# Patient Record
Sex: MALE | Race: WHITE
[De-identification: names, ages, dates, MRNs, and addresses within clinical notes are randomized per-mention and may not be internally consistent; named-entity substitution may affect disease eponyms.]

---

## 2018-07-29 ENCOUNTER — HOSPITAL ENCOUNTER (EMERGENCY)
Dept: HOSPITAL 12 - ER | Age: 58
LOS: 1 days | Discharge: HOME | End: 2018-07-30
Payer: SELF-PAY

## 2018-07-29 VITALS — WEIGHT: 140 LBS | BODY MASS INDEX: 20.73 KG/M2 | HEIGHT: 69 IN

## 2018-07-29 DIAGNOSIS — Z90.3: ICD-10-CM

## 2018-07-29 DIAGNOSIS — H57.11: Primary | ICD-10-CM

## 2018-07-29 PROCEDURE — A4663 DIALYSIS BLOOD PRESSURE CUFF: HCPCS

## 2018-07-30 VITALS — DIASTOLIC BLOOD PRESSURE: 78 MMHG | SYSTOLIC BLOOD PRESSURE: 118 MMHG

## 2018-07-30 NOTE — NUR
Patient discharged to home in stable conditon. Patient reported eye being 
pain-free and vision returning to baseline prior to discharge. Written and 
verbal after care instructions given. Patient verbalizes understanding of 
instructions. Patient able to ambulate unassisted with a steady gait. Patient 
left with all personal belongings.

## 2018-12-14 ENCOUNTER — HOSPITAL ENCOUNTER (INPATIENT)
Dept: HOSPITAL 12 - SRC | Age: 58
LOS: 8 days | Discharge: OTHER INSTITUTION | DRG: 895 | End: 2018-12-22
Attending: INTERNAL MEDICINE | Admitting: INTERNAL MEDICINE
Payer: COMMERCIAL

## 2018-12-14 VITALS — SYSTOLIC BLOOD PRESSURE: 148 MMHG | DIASTOLIC BLOOD PRESSURE: 86 MMHG

## 2018-12-14 VITALS — DIASTOLIC BLOOD PRESSURE: 73 MMHG | SYSTOLIC BLOOD PRESSURE: 127 MMHG

## 2018-12-14 VITALS — SYSTOLIC BLOOD PRESSURE: 126 MMHG | DIASTOLIC BLOOD PRESSURE: 67 MMHG

## 2018-12-14 VITALS — WEIGHT: 130 LBS | BODY MASS INDEX: 19.26 KG/M2 | HEIGHT: 69 IN

## 2018-12-14 VITALS — DIASTOLIC BLOOD PRESSURE: 61 MMHG | SYSTOLIC BLOOD PRESSURE: 106 MMHG

## 2018-12-14 VITALS — DIASTOLIC BLOOD PRESSURE: 90 MMHG | SYSTOLIC BLOOD PRESSURE: 184 MMHG

## 2018-12-14 DIAGNOSIS — F15.20: ICD-10-CM

## 2018-12-14 DIAGNOSIS — E11.9: ICD-10-CM

## 2018-12-14 DIAGNOSIS — I10: ICD-10-CM

## 2018-12-14 DIAGNOSIS — J96.21: ICD-10-CM

## 2018-12-14 DIAGNOSIS — Z79.4: ICD-10-CM

## 2018-12-14 DIAGNOSIS — Z90.3: ICD-10-CM

## 2018-12-14 DIAGNOSIS — F41.9: ICD-10-CM

## 2018-12-14 DIAGNOSIS — J69.0: ICD-10-CM

## 2018-12-14 DIAGNOSIS — D64.9: ICD-10-CM

## 2018-12-14 DIAGNOSIS — J15.9: ICD-10-CM

## 2018-12-14 DIAGNOSIS — K86.1: ICD-10-CM

## 2018-12-14 DIAGNOSIS — E83.42: ICD-10-CM

## 2018-12-14 DIAGNOSIS — E46: ICD-10-CM

## 2018-12-14 DIAGNOSIS — J44.0: ICD-10-CM

## 2018-12-14 DIAGNOSIS — F17.210: ICD-10-CM

## 2018-12-14 DIAGNOSIS — Z85.028: ICD-10-CM

## 2018-12-14 DIAGNOSIS — E11.649: ICD-10-CM

## 2018-12-14 DIAGNOSIS — J45.909: ICD-10-CM

## 2018-12-14 DIAGNOSIS — Z87.01: ICD-10-CM

## 2018-12-14 DIAGNOSIS — F11.23: ICD-10-CM

## 2018-12-14 DIAGNOSIS — J44.1: ICD-10-CM

## 2018-12-14 DIAGNOSIS — R79.89: ICD-10-CM

## 2018-12-14 DIAGNOSIS — F13.230: Primary | ICD-10-CM

## 2018-12-14 DIAGNOSIS — F32.9: ICD-10-CM

## 2018-12-14 LAB
ALP SERPL-CCNC: 78 U/L (ref 50–136)
ALT SERPL W/O P-5'-P-CCNC: 31 U/L (ref 16–63)
AMPHETAMINES UR QL SCN>1000 NG/ML: POSITIVE
AST SERPL-CCNC: 17 U/L (ref 15–37)
BARBITURATES UR QL SCN: NEGATIVE
BASOPHILS # BLD AUTO: 0 K/UL (ref 0–8)
BASOPHILS NFR BLD AUTO: 0.3 % (ref 0–2)
BILIRUB SERPL-MCNC: 0.2 MG/DL (ref 0.2–1)
BUN SERPL-MCNC: 24 MG/DL (ref 7–18)
CHLORIDE SERPL-SCNC: 105 MMOL/L (ref 98–107)
CO2 SERPL-SCNC: 24 MMOL/L (ref 21–32)
COCAINE UR QL SCN: NEGATIVE
CREAT SERPL-MCNC: 1.1 MG/DL (ref 0.6–1.3)
EOSINOPHIL # BLD AUTO: 0.1 K/UL (ref 0–0.7)
EOSINOPHIL NFR BLD AUTO: 1.1 % (ref 0–7)
ETHANOL SERPL-MCNC: < 3 MG/DL (ref 0–0)
GLUCOSE SERPL-MCNC: 294 MG/DL (ref 74–106)
HCT VFR BLD AUTO: 33.9 % (ref 36.7–47.1)
HGB BLD-MCNC: 11.4 G/DL (ref 12.5–16.3)
LYMPHOCYTES # BLD AUTO: 2.2 K/UL (ref 20–40)
LYMPHOCYTES NFR BLD AUTO: 22.3 % (ref 20.5–51.5)
MAGNESIUM SERPL-MCNC: 1.8 MG/DL (ref 1.8–2.4)
MCH RBC QN AUTO: 31.7 UUG (ref 23.8–33.4)
MCHC RBC AUTO-ENTMCNC: 34 G/DL (ref 32.5–36.3)
MCV RBC AUTO: 94 FL (ref 73–96.2)
MONOCYTES # BLD AUTO: 0.7 K/UL (ref 2–10)
MONOCYTES NFR BLD AUTO: 7.2 % (ref 0–11)
NEUTROPHILS # BLD AUTO: 6.7 K/UL (ref 1.8–8.9)
NEUTROPHILS NFR BLD AUTO: 69.1 % (ref 38.5–71.5)
OPIATES UR QL SCN: POSITIVE
PCP UR QL SCN>25 NG/ML: NEGATIVE
PLATELET # BLD AUTO: 379 K/UL (ref 152–348)
POTASSIUM SERPL-SCNC: 5.1 MMOL/L (ref 3.5–5.1)
RBC # BLD AUTO: 3.6 MIL/UL (ref 4.06–5.63)
THC UR QL SCN>50 NG/ML: NEGATIVE
TSH SERPL DL<=0.005 MIU/L-ACNC: 2.37 MIU/ML (ref 0.36–3.74)
WBC # BLD AUTO: 9.7 K/UL (ref 3.6–10.2)
WS STN SPEC: 6.5 G/DL (ref 6.4–8.2)

## 2018-12-14 PROCEDURE — A4663 DIALYSIS BLOOD PRESSURE CUFF: HCPCS

## 2018-12-14 PROCEDURE — G0480 DRUG TEST DEF 1-7 CLASSES: HCPCS

## 2018-12-14 PROCEDURE — HZ2ZZZZ DETOXIFICATION SERVICES FOR SUBSTANCE ABUSE TREATMENT: ICD-10-PCS | Performed by: INTERNAL MEDICINE

## 2018-12-14 RX ADMIN — DIAZEPAM SCH MG: 10 TABLET ORAL at 21:20

## 2018-12-14 RX ADMIN — Medication SCH MG: at 21:20

## 2018-12-14 RX ADMIN — Medication SCH CAP: at 16:48

## 2018-12-14 RX ADMIN — FLUTICASONE FUROATE AND VILANTEROL TRIFENATATE SCH EACH: 100; 25 POWDER RESPIRATORY (INHALATION) at 15:17

## 2018-12-14 RX ADMIN — VALACYCLOVIR HYDROCHLORIDE SCH MG: 500 TABLET, FILM COATED ORAL at 15:16

## 2018-12-14 RX ADMIN — SODIUM CHLORIDE PRN UNIT: 9 INJECTION, SOLUTION INTRAVENOUS at 16:46

## 2018-12-14 RX ADMIN — Medication SCH EACH: at 16:42

## 2018-12-14 RX ADMIN — SODIUM CHLORIDE PRN UNIT: 9 INJECTION, SOLUTION INTRAVENOUS at 21:34

## 2018-12-14 RX ADMIN — AMITRIPTYLINE HYDROCHLORIDE SCH MG: 50 TABLET, FILM COATED ORAL at 21:21

## 2018-12-14 RX ADMIN — SODIUM CHLORIDE PRN UNIT: 9 INJECTION, SOLUTION INTRAVENOUS at 11:44

## 2018-12-14 RX ADMIN — SODIUM CHLORIDE PRN UNIT: 9 INJECTION, SOLUTION INTRAVENOUS at 08:53

## 2018-12-14 RX ADMIN — Medication SCH EACH: at 21:31

## 2018-12-14 RX ADMIN — Medication SCH EACH: at 11:39

## 2018-12-14 RX ADMIN — LORAZEPAM PRN MG: 1 TABLET ORAL at 08:56

## 2018-12-14 RX ADMIN — Medication SCH CAP: at 15:16

## 2018-12-14 RX ADMIN — DIAZEPAM SCH MG: 10 TABLET ORAL at 15:16

## 2018-12-14 RX ADMIN — SODIUM CHLORIDE PRN UNIT: 9 INJECTION, SOLUTION INTRAVENOUS at 09:44

## 2018-12-14 RX ADMIN — Medication SCH EACH: at 08:52

## 2018-12-14 RX ADMIN — BUPRENORPHINE HYDROCHLORIDE SCH MG: 2 TABLET SUBLINGUAL at 21:20

## 2018-12-14 RX ADMIN — CLONIDINE HYDROCHLORIDE PRN MG: 0.1 TABLET ORAL at 09:08

## 2018-12-14 RX ADMIN — PREGABALIN SCH MG: 100 CAPSULE ORAL at 16:48

## 2018-12-14 RX ADMIN — BUPRENORPHINE HYDROCHLORIDE SCH MG: 2 TABLET SUBLINGUAL at 13:25

## 2018-12-14 RX ADMIN — BUPRENORPHINE HYDROCHLORIDE SCH MG: 2 TABLET SUBLINGUAL at 17:00

## 2018-12-14 RX ADMIN — LORAZEPAM PRN MG: 1 TABLET ORAL at 13:25

## 2018-12-14 RX ADMIN — ACETAMINOPHEN PRN MG: 325 TABLET ORAL at 09:45

## 2018-12-14 RX ADMIN — INSULIN GLARGINE SCH UNITS: 100 INJECTION, SOLUTION SUBCUTANEOUS at 21:33

## 2018-12-14 RX ADMIN — OLANZAPINE SCH MG: 2.5 TABLET ORAL at 21:22

## 2018-12-14 RX ADMIN — Medication SCH MG: at 15:17

## 2018-12-15 VITALS — DIASTOLIC BLOOD PRESSURE: 56 MMHG | SYSTOLIC BLOOD PRESSURE: 98 MMHG

## 2018-12-15 VITALS — DIASTOLIC BLOOD PRESSURE: 74 MMHG | SYSTOLIC BLOOD PRESSURE: 137 MMHG

## 2018-12-15 VITALS — DIASTOLIC BLOOD PRESSURE: 64 MMHG | SYSTOLIC BLOOD PRESSURE: 126 MMHG

## 2018-12-15 VITALS — SYSTOLIC BLOOD PRESSURE: 93 MMHG | DIASTOLIC BLOOD PRESSURE: 50 MMHG

## 2018-12-15 VITALS — DIASTOLIC BLOOD PRESSURE: 69 MMHG | SYSTOLIC BLOOD PRESSURE: 111 MMHG

## 2018-12-15 RX ADMIN — Medication SCH MG: at 21:32

## 2018-12-15 RX ADMIN — Medication SCH MG: at 16:34

## 2018-12-15 RX ADMIN — THERA TABS SCH UDTAB: TAB at 08:27

## 2018-12-15 RX ADMIN — Medication SCH MG: at 10:51

## 2018-12-15 RX ADMIN — KETOROLAC TROMETHAMINE PRN MG: 30 INJECTION, SOLUTION INTRAMUSCULAR; INTRAVENOUS at 08:38

## 2018-12-15 RX ADMIN — BUPRENORPHINE HYDROCHLORIDE SCH MG: 2 TABLET SUBLINGUAL at 21:32

## 2018-12-15 RX ADMIN — PREGABALIN SCH MG: 100 CAPSULE ORAL at 08:23

## 2018-12-15 RX ADMIN — AMITRIPTYLINE HYDROCHLORIDE SCH MG: 50 TABLET, FILM COATED ORAL at 21:32

## 2018-12-15 RX ADMIN — ACETAMINOPHEN PRN MG: 325 TABLET ORAL at 04:26

## 2018-12-15 RX ADMIN — HYDROXYZINE PAMOATE PRN MG: 25 CAPSULE ORAL at 17:27

## 2018-12-15 RX ADMIN — INSULIN GLARGINE SCH UNITS: 100 INJECTION, SOLUTION SUBCUTANEOUS at 21:10

## 2018-12-15 RX ADMIN — METHOCARBAMOL TABLETS PRN MG: 750 TABLET, COATED ORAL at 16:41

## 2018-12-15 RX ADMIN — DIAZEPAM SCH MG: 5 TABLET ORAL at 16:34

## 2018-12-15 RX ADMIN — SODIUM CHLORIDE PRN UNIT: 9 INJECTION, SOLUTION INTRAVENOUS at 12:24

## 2018-12-15 RX ADMIN — FLUTICASONE FUROATE AND VILANTEROL TRIFENATATE SCH EACH: 100; 25 POWDER RESPIRATORY (INHALATION) at 08:28

## 2018-12-15 RX ADMIN — Medication SCH CAP: at 16:41

## 2018-12-15 RX ADMIN — CLONIDINE HYDROCHLORIDE PRN MG: 0.1 TABLET ORAL at 04:26

## 2018-12-15 RX ADMIN — DIAZEPAM SCH MG: 5 TABLET ORAL at 21:32

## 2018-12-15 RX ADMIN — ACETAMINOPHEN PRN MG: 325 TABLET ORAL at 16:41

## 2018-12-15 RX ADMIN — PREGABALIN SCH MG: 100 CAPSULE ORAL at 16:34

## 2018-12-15 RX ADMIN — OLANZAPINE SCH MG: 2.5 TABLET ORAL at 21:31

## 2018-12-15 RX ADMIN — Medication SCH EACH: at 12:12

## 2018-12-15 RX ADMIN — Medication SCH EACH: at 08:00

## 2018-12-15 RX ADMIN — BUPRENORPHINE HYDROCHLORIDE SCH MG: 2 TABLET SUBLINGUAL at 08:23

## 2018-12-15 RX ADMIN — Medication SCH EACH: at 16:29

## 2018-12-15 RX ADMIN — DIAZEPAM SCH MG: 5 TABLET ORAL at 08:27

## 2018-12-15 RX ADMIN — LIDOCAINE SCH PATCH: 50 PATCH CUTANEOUS at 18:56

## 2018-12-15 RX ADMIN — Medication SCH CAP: at 08:29

## 2018-12-15 RX ADMIN — Medication SCH CAP: at 13:59

## 2018-12-15 RX ADMIN — Medication SCH MG: at 08:27

## 2018-12-15 RX ADMIN — BUPRENORPHINE HYDROCHLORIDE SCH MG: 2 TABLET SUBLINGUAL at 13:59

## 2018-12-15 RX ADMIN — VALACYCLOVIR HYDROCHLORIDE SCH MG: 500 TABLET, FILM COATED ORAL at 08:22

## 2018-12-15 RX ADMIN — Medication SCH EACH: at 21:07

## 2018-12-15 RX ADMIN — DIAZEPAM SCH MG: 5 TABLET ORAL at 13:58

## 2018-12-15 RX ADMIN — KETOROLAC TROMETHAMINE PRN MG: 30 INJECTION, SOLUTION INTRAMUSCULAR; INTRAVENOUS at 17:28

## 2018-12-15 RX ADMIN — METHOCARBAMOL TABLETS PRN MG: 750 TABLET, COATED ORAL at 04:25

## 2018-12-16 VITALS — DIASTOLIC BLOOD PRESSURE: 57 MMHG | SYSTOLIC BLOOD PRESSURE: 114 MMHG

## 2018-12-16 VITALS — DIASTOLIC BLOOD PRESSURE: 67 MMHG | SYSTOLIC BLOOD PRESSURE: 125 MMHG

## 2018-12-16 VITALS — SYSTOLIC BLOOD PRESSURE: 108 MMHG | DIASTOLIC BLOOD PRESSURE: 61 MMHG

## 2018-12-16 VITALS — DIASTOLIC BLOOD PRESSURE: 57 MMHG | SYSTOLIC BLOOD PRESSURE: 109 MMHG

## 2018-12-16 LAB
ALP SERPL-CCNC: 71 U/L (ref 50–136)
ALT SERPL W/O P-5'-P-CCNC: 31 U/L (ref 16–63)
APPEARANCE UR: CLEAR
AST SERPL-CCNC: 47 U/L (ref 15–37)
BASOPHILS # BLD AUTO: 0.1 K/UL (ref 0–8)
BASOPHILS NFR BLD AUTO: 0.5 % (ref 0–2)
BILIRUB SERPL-MCNC: 0.3 MG/DL (ref 0.2–1)
BILIRUB UR QL STRIP: NEGATIVE
BUN SERPL-MCNC: 37 MG/DL (ref 7–18)
CHLORIDE SERPL-SCNC: 106 MMOL/L (ref 98–107)
CO2 SERPL-SCNC: 25 MMOL/L (ref 21–32)
COLOR UR: YELLOW
CREAT SERPL-MCNC: 1.2 MG/DL (ref 0.6–1.3)
EOSINOPHIL # BLD AUTO: 0 K/UL (ref 0–0.7)
EOSINOPHIL NFR BLD AUTO: 0.1 % (ref 0–7)
GLUCOSE SERPL-MCNC: 86 MG/DL (ref 74–106)
GLUCOSE UR STRIP-MCNC: NEGATIVE MG/DL
HCT VFR BLD AUTO: 32.3 % (ref 36.7–47.1)
HGB BLD-MCNC: 10.9 G/DL (ref 12.5–16.3)
HGB UR QL STRIP: NEGATIVE
KETONES UR STRIP-MCNC: NEGATIVE MG/DL
LEUKOCYTE ESTERASE UR QL STRIP: NEGATIVE
LYMPHOCYTES # BLD AUTO: 1.6 K/UL (ref 20–40)
LYMPHOCYTES NFR BLD AUTO: 10.3 % (ref 20.5–51.5)
MAGNESIUM SERPL-MCNC: 2.2 MG/DL (ref 1.8–2.4)
MCH RBC QN AUTO: 31.9 UUG (ref 23.8–33.4)
MCHC RBC AUTO-ENTMCNC: 34 G/DL (ref 32.5–36.3)
MCV RBC AUTO: 94.1 FL (ref 73–96.2)
MONOCYTES # BLD AUTO: 1 K/UL (ref 2–10)
MONOCYTES NFR BLD AUTO: 6.6 % (ref 0–11)
MUCOUS THREADS URNS QL MICRO: (no result) /LPF
NEUTROPHILS # BLD AUTO: 12.9 K/UL (ref 1.8–8.9)
NEUTROPHILS NFR BLD AUTO: 82.5 % (ref 38.5–71.5)
NITRITE UR QL STRIP: NEGATIVE
PH UR STRIP: 5.5 [PH] (ref 5–8)
PLATELET # BLD AUTO: 368 K/UL (ref 152–348)
POTASSIUM SERPL-SCNC: 4.5 MMOL/L (ref 3.5–5.1)
PROT UR QL STRIP: NEGATIVE
RBC # BLD AUTO: 3.43 MIL/UL (ref 4.06–5.63)
SP GR UR STRIP: 1.02 (ref 1–1.03)
UROBILINOGEN UR STRIP-MCNC: 0.2 E.U./DL
WBC # BLD AUTO: 15.6 K/UL (ref 3.6–10.2)
WBC #/AREA URNS HPF: (no result) /HPF (ref 0–3)
WS STN SPEC: 5.8 G/DL (ref 6.4–8.2)

## 2018-12-16 RX ADMIN — THERA TABS SCH UDTAB: TAB at 09:00

## 2018-12-16 RX ADMIN — VALACYCLOVIR HYDROCHLORIDE SCH MG: 500 TABLET, FILM COATED ORAL at 08:54

## 2018-12-16 RX ADMIN — ACETAMINOPHEN PRN MG: 325 TABLET ORAL at 16:37

## 2018-12-16 RX ADMIN — ALBUTEROL SULFATE SCH MG: 2.5 SOLUTION RESPIRATORY (INHALATION) at 19:48

## 2018-12-16 RX ADMIN — Medication SCH MG: at 09:00

## 2018-12-16 RX ADMIN — DIAZEPAM SCH MG: 5 TABLET ORAL at 09:00

## 2018-12-16 RX ADMIN — AMITRIPTYLINE HYDROCHLORIDE SCH MG: 50 TABLET, FILM COATED ORAL at 21:10

## 2018-12-16 RX ADMIN — Medication SCH MG: at 08:53

## 2018-12-16 RX ADMIN — PREGABALIN SCH MG: 100 CAPSULE ORAL at 17:08

## 2018-12-16 RX ADMIN — Medication SCH EACH: at 21:09

## 2018-12-16 RX ADMIN — Medication SCH EACH: at 08:01

## 2018-12-16 RX ADMIN — BUPRENORPHINE HYDROCHLORIDE SCH MG: 2 TABLET SUBLINGUAL at 21:00

## 2018-12-16 RX ADMIN — Medication SCH CAP: at 09:00

## 2018-12-16 RX ADMIN — DIAZEPAM SCH MG: 5 TABLET ORAL at 14:26

## 2018-12-16 RX ADMIN — OLANZAPINE SCH MG: 2.5 TABLET ORAL at 21:10

## 2018-12-16 RX ADMIN — FLUTICASONE FUROATE AND VILANTEROL TRIFENATATE SCH EACH: 100; 25 POWDER RESPIRATORY (INHALATION) at 09:00

## 2018-12-16 RX ADMIN — PREGABALIN SCH MG: 100 CAPSULE ORAL at 08:54

## 2018-12-16 RX ADMIN — Medication SCH ML: at 13:00

## 2018-12-16 RX ADMIN — Medication SCH CAP: at 17:07

## 2018-12-16 RX ADMIN — SODIUM CHLORIDE PRN UNIT: 9 INJECTION, SOLUTION INTRAVENOUS at 17:05

## 2018-12-16 RX ADMIN — LIDOCAINE SCH PATCH: 50 PATCH CUTANEOUS at 19:10

## 2018-12-16 RX ADMIN — IPRATROPIUM BROMIDE SCH MG: 0.5 SOLUTION RESPIRATORY (INHALATION) at 19:48

## 2018-12-16 RX ADMIN — Medication SCH CAP: at 13:00

## 2018-12-16 RX ADMIN — Medication SCH MG: at 21:00

## 2018-12-16 RX ADMIN — Medication SCH MG: at 08:54

## 2018-12-16 RX ADMIN — DEXTROSE SCH MLS/HR: 5 SOLUTION INTRAVENOUS at 15:56

## 2018-12-16 RX ADMIN — Medication SCH ML: at 17:34

## 2018-12-16 RX ADMIN — BUPRENORPHINE HYDROCHLORIDE SCH MG: 2 TABLET SUBLINGUAL at 15:57

## 2018-12-16 RX ADMIN — Medication SCH EACH: at 11:33

## 2018-12-16 RX ADMIN — Medication SCH EACH: at 16:37

## 2018-12-16 RX ADMIN — DIAZEPAM SCH MG: 5 TABLET ORAL at 21:09

## 2018-12-16 RX ADMIN — VALACYCLOVIR HYDROCHLORIDE SCH MG: 500 TABLET, FILM COATED ORAL at 09:00

## 2018-12-17 VITALS — SYSTOLIC BLOOD PRESSURE: 97 MMHG | DIASTOLIC BLOOD PRESSURE: 46 MMHG

## 2018-12-17 VITALS — SYSTOLIC BLOOD PRESSURE: 95 MMHG | DIASTOLIC BLOOD PRESSURE: 55 MMHG

## 2018-12-17 VITALS — DIASTOLIC BLOOD PRESSURE: 54 MMHG | SYSTOLIC BLOOD PRESSURE: 87 MMHG

## 2018-12-17 VITALS — DIASTOLIC BLOOD PRESSURE: 55 MMHG | SYSTOLIC BLOOD PRESSURE: 105 MMHG

## 2018-12-17 LAB
BASOPHILS # BLD AUTO: 0 K/UL (ref 0–8)
BASOPHILS NFR BLD AUTO: 0.3 % (ref 0–2)
BUN SERPL-MCNC: 33 MG/DL (ref 7–18)
CHLORIDE SERPL-SCNC: 102 MMOL/L (ref 98–107)
CO2 SERPL-SCNC: 29 MMOL/L (ref 21–32)
CREAT SERPL-MCNC: 1.2 MG/DL (ref 0.6–1.3)
EOSINOPHIL # BLD AUTO: 0 K/UL (ref 0–0.7)
EOSINOPHIL NFR BLD AUTO: 0.1 % (ref 0–7)
GLUCOSE SERPL-MCNC: 82 MG/DL (ref 74–106)
HCT VFR BLD AUTO: 33.7 % (ref 36.7–47.1)
HGB BLD-MCNC: 11.3 G/DL (ref 12.5–16.3)
LYMPHOCYTES # BLD AUTO: 0.9 K/UL (ref 20–40)
LYMPHOCYTES NFR BLD AUTO: 8.9 % (ref 20.5–51.5)
MAGNESIUM SERPL-MCNC: 2 MG/DL (ref 1.8–2.4)
MCH RBC QN AUTO: 31.9 UUG (ref 23.8–33.4)
MCHC RBC AUTO-ENTMCNC: 34 G/DL (ref 32.5–36.3)
MCV RBC AUTO: 95.4 FL (ref 73–96.2)
MONOCYTES # BLD AUTO: 0.5 K/UL (ref 2–10)
MONOCYTES NFR BLD AUTO: 5 % (ref 0–11)
NEUTROPHILS # BLD AUTO: 8.4 K/UL (ref 1.8–8.9)
NEUTROPHILS NFR BLD AUTO: 85.7 % (ref 38.5–71.5)
PHOSPHATE SERPL-MCNC: 3.3 MG/DL (ref 2.5–4.9)
PLATELET # BLD AUTO: 414 K/UL (ref 152–348)
POTASSIUM SERPL-SCNC: 4.5 MMOL/L (ref 3.5–5.1)
RBC # BLD AUTO: 3.54 MIL/UL (ref 4.06–5.63)
WBC # BLD AUTO: 9.8 K/UL (ref 3.6–10.2)

## 2018-12-17 PROCEDURE — HZ31ZZZ INDIVIDUAL COUNSELING FOR SUBSTANCE ABUSE TREATMENT, BEHAVIORAL: ICD-10-PCS

## 2018-12-17 RX ADMIN — BUPRENORPHINE HYDROCHLORIDE SCH MG: 2 TABLET SUBLINGUAL at 21:30

## 2018-12-17 RX ADMIN — VALACYCLOVIR HYDROCHLORIDE SCH MG: 500 TABLET, FILM COATED ORAL at 12:11

## 2018-12-17 RX ADMIN — Medication SCH ML: at 12:30

## 2018-12-17 RX ADMIN — Medication SCH MG: at 09:00

## 2018-12-17 RX ADMIN — METHOCARBAMOL TABLETS PRN MG: 750 TABLET, COATED ORAL at 21:30

## 2018-12-17 RX ADMIN — LIDOCAINE SCH PATCH: 50 PATCH CUTANEOUS at 21:00

## 2018-12-17 RX ADMIN — DIAZEPAM SCH MG: 5 TABLET ORAL at 21:29

## 2018-12-17 RX ADMIN — FLUTICASONE FUROATE AND VILANTEROL TRIFENATATE SCH EACH: 100; 25 POWDER RESPIRATORY (INHALATION) at 09:36

## 2018-12-17 RX ADMIN — Medication SCH EACH: at 16:51

## 2018-12-17 RX ADMIN — Medication SCH EACH: at 07:39

## 2018-12-17 RX ADMIN — SODIUM CHLORIDE SCH MLS/HR: 0.9 INJECTION, SOLUTION INTRAVENOUS at 09:40

## 2018-12-17 RX ADMIN — TAZOBACTAM SODIUM AND PIPERACILLIN SODIUM SCH MLS/HR: 375; 3 INJECTION, SOLUTION INTRAVENOUS at 13:40

## 2018-12-17 RX ADMIN — ALBUTEROL SULFATE SCH MG: 2.5 SOLUTION RESPIRATORY (INHALATION) at 14:03

## 2018-12-17 RX ADMIN — Medication SCH CAP: at 17:47

## 2018-12-17 RX ADMIN — Medication SCH EACH: at 21:21

## 2018-12-17 RX ADMIN — Medication SCH MG: at 12:08

## 2018-12-17 RX ADMIN — PREGABALIN SCH MG: 100 CAPSULE ORAL at 12:08

## 2018-12-17 RX ADMIN — BUPRENORPHINE HYDROCHLORIDE SCH MG: 2 TABLET SUBLINGUAL at 09:00

## 2018-12-17 RX ADMIN — ALBUTEROL SULFATE SCH MG: 2.5 SOLUTION RESPIRATORY (INHALATION) at 08:45

## 2018-12-17 RX ADMIN — IPRATROPIUM BROMIDE SCH MG: 0.5 SOLUTION RESPIRATORY (INHALATION) at 20:05

## 2018-12-17 RX ADMIN — IPRATROPIUM BROMIDE SCH MG: 0.5 SOLUTION RESPIRATORY (INHALATION) at 14:03

## 2018-12-17 RX ADMIN — TAZOBACTAM SODIUM AND PIPERACILLIN SODIUM SCH MLS/HR: 375; 3 INJECTION, SOLUTION INTRAVENOUS at 22:33

## 2018-12-17 RX ADMIN — IPRATROPIUM BROMIDE SCH MG: 0.5 SOLUTION RESPIRATORY (INHALATION) at 08:45

## 2018-12-17 RX ADMIN — BUPRENORPHINE HYDROCHLORIDE SCH MG: 2 TABLET SUBLINGUAL at 15:00

## 2018-12-17 RX ADMIN — SODIUM CHLORIDE SCH MLS/HR: 0.9 INJECTION, SOLUTION INTRAVENOUS at 22:33

## 2018-12-17 RX ADMIN — SODIUM CHLORIDE PRN UNIT: 9 INJECTION, SOLUTION INTRAVENOUS at 21:13

## 2018-12-17 RX ADMIN — DIAZEPAM SCH MG: 5 TABLET ORAL at 09:36

## 2018-12-17 RX ADMIN — Medication SCH EACH: at 12:16

## 2018-12-17 RX ADMIN — Medication SCH CAP: at 12:08

## 2018-12-17 RX ADMIN — SODIUM CHLORIDE PRN UNIT: 9 INJECTION, SOLUTION INTRAVENOUS at 17:43

## 2018-12-17 RX ADMIN — Medication SCH ML: at 17:46

## 2018-12-17 RX ADMIN — Medication SCH CAP: at 13:00

## 2018-12-17 RX ADMIN — ACETAMINOPHEN PRN MG: 325 TABLET ORAL at 07:37

## 2018-12-17 RX ADMIN — THERA TABS SCH UDTAB: TAB at 12:08

## 2018-12-17 RX ADMIN — Medication SCH ML: at 08:00

## 2018-12-17 RX ADMIN — DEXTROSE SCH MLS/HR: 5 SOLUTION INTRAVENOUS at 16:09

## 2018-12-17 RX ADMIN — AMITRIPTYLINE HYDROCHLORIDE SCH MG: 50 TABLET, FILM COATED ORAL at 20:25

## 2018-12-17 RX ADMIN — ALBUTEROL SULFATE SCH MG: 2.5 SOLUTION RESPIRATORY (INHALATION) at 20:05

## 2018-12-17 RX ADMIN — PREGABALIN SCH MG: 100 CAPSULE ORAL at 17:47

## 2018-12-18 VITALS — DIASTOLIC BLOOD PRESSURE: 59 MMHG | SYSTOLIC BLOOD PRESSURE: 100 MMHG

## 2018-12-18 VITALS — SYSTOLIC BLOOD PRESSURE: 112 MMHG | DIASTOLIC BLOOD PRESSURE: 63 MMHG

## 2018-12-18 VITALS — DIASTOLIC BLOOD PRESSURE: 62 MMHG | SYSTOLIC BLOOD PRESSURE: 120 MMHG

## 2018-12-18 VITALS — SYSTOLIC BLOOD PRESSURE: 108 MMHG | DIASTOLIC BLOOD PRESSURE: 57 MMHG

## 2018-12-18 LAB
BASOPHILS # BLD AUTO: 0 K/UL (ref 0–8)
BASOPHILS NFR BLD AUTO: 0.4 % (ref 0–2)
BUN SERPL-MCNC: 22 MG/DL (ref 7–18)
CHLORIDE SERPL-SCNC: 102 MMOL/L (ref 98–107)
CO2 SERPL-SCNC: 29 MMOL/L (ref 21–32)
CREAT SERPL-MCNC: 1 MG/DL (ref 0.6–1.3)
EOSINOPHIL # BLD AUTO: 0 K/UL (ref 0–0.7)
EOSINOPHIL NFR BLD AUTO: 0.4 % (ref 0–7)
GLUCOSE SERPL-MCNC: 161 MG/DL (ref 74–106)
HCT VFR BLD AUTO: 27.2 % (ref 36.7–47.1)
HGB BLD-MCNC: 9.2 G/DL (ref 12.5–16.3)
LYMPHOCYTES # BLD AUTO: 1.4 K/UL (ref 20–40)
LYMPHOCYTES NFR BLD AUTO: 15.6 % (ref 20.5–51.5)
MAGNESIUM SERPL-MCNC: 2 MG/DL (ref 1.8–2.4)
MCH RBC QN AUTO: 31.5 UUG (ref 23.8–33.4)
MCHC RBC AUTO-ENTMCNC: 34 G/DL (ref 32.5–36.3)
MCV RBC AUTO: 93.6 FL (ref 73–96.2)
MONOCYTES # BLD AUTO: 0.8 K/UL (ref 2–10)
MONOCYTES NFR BLD AUTO: 9 % (ref 0–11)
NEUTROPHILS # BLD AUTO: 6.9 K/UL (ref 1.8–8.9)
NEUTROPHILS NFR BLD AUTO: 74.6 % (ref 38.5–71.5)
PHOSPHATE SERPL-MCNC: 3.1 MG/DL (ref 2.5–4.9)
PLATELET # BLD AUTO: 360 K/UL (ref 152–348)
POTASSIUM SERPL-SCNC: 4.5 MMOL/L (ref 3.5–5.1)
RBC # BLD AUTO: 2.91 MIL/UL (ref 4.06–5.63)
WBC # BLD AUTO: 9.2 K/UL (ref 3.6–10.2)

## 2018-12-18 RX ADMIN — TAZOBACTAM SODIUM AND PIPERACILLIN SODIUM SCH MLS/HR: 375; 3 INJECTION, SOLUTION INTRAVENOUS at 05:32

## 2018-12-18 RX ADMIN — Medication SCH CAP: at 12:43

## 2018-12-18 RX ADMIN — IPRATROPIUM BROMIDE SCH MG: 0.5 SOLUTION RESPIRATORY (INHALATION) at 19:40

## 2018-12-18 RX ADMIN — Medication SCH EACH: at 21:07

## 2018-12-18 RX ADMIN — IPRATROPIUM BROMIDE SCH MG: 0.5 SOLUTION RESPIRATORY (INHALATION) at 07:35

## 2018-12-18 RX ADMIN — Medication SCH ML: at 12:42

## 2018-12-18 RX ADMIN — Medication SCH EACH: at 12:17

## 2018-12-18 RX ADMIN — AMITRIPTYLINE HYDROCHLORIDE SCH MG: 50 TABLET, FILM COATED ORAL at 20:37

## 2018-12-18 RX ADMIN — FLUTICASONE FUROATE AND VILANTEROL TRIFENATATE SCH EACH: 100; 25 POWDER RESPIRATORY (INHALATION) at 10:07

## 2018-12-18 RX ADMIN — ALBUTEROL SULFATE SCH MG: 2.5 SOLUTION RESPIRATORY (INHALATION) at 13:32

## 2018-12-18 RX ADMIN — Medication SCH MG: at 10:07

## 2018-12-18 RX ADMIN — Medication SCH CAP: at 09:00

## 2018-12-18 RX ADMIN — DEXTROSE SCH MLS/HR: 5 SOLUTION INTRAVENOUS at 15:30

## 2018-12-18 RX ADMIN — ALBUTEROL SULFATE SCH MG: 2.5 SOLUTION RESPIRATORY (INHALATION) at 07:35

## 2018-12-18 RX ADMIN — SODIUM CHLORIDE PRN UNIT: 9 INJECTION, SOLUTION INTRAVENOUS at 12:39

## 2018-12-18 RX ADMIN — SODIUM CHLORIDE PRN UNIT: 9 INJECTION, SOLUTION INTRAVENOUS at 10:19

## 2018-12-18 RX ADMIN — Medication SCH CAP: at 16:49

## 2018-12-18 RX ADMIN — THERA TABS SCH UDTAB: TAB at 09:00

## 2018-12-18 RX ADMIN — LIDOCAINE SCH PATCH: 50 PATCH CUTANEOUS at 20:38

## 2018-12-18 RX ADMIN — SODIUM CHLORIDE SCH MLS/HR: 0.9 INJECTION, SOLUTION INTRAVENOUS at 12:45

## 2018-12-18 RX ADMIN — Medication SCH EACH: at 07:38

## 2018-12-18 RX ADMIN — ENOXAPARIN SODIUM SCH MG: 40 INJECTION SUBCUTANEOUS at 12:41

## 2018-12-18 RX ADMIN — ALBUTEROL SULFATE SCH MG: 2.5 SOLUTION RESPIRATORY (INHALATION) at 08:51

## 2018-12-18 RX ADMIN — SODIUM CHLORIDE PRN UNIT: 9 INJECTION, SOLUTION INTRAVENOUS at 21:09

## 2018-12-18 RX ADMIN — ALBUTEROL SULFATE SCH MG: 2.5 SOLUTION RESPIRATORY (INHALATION) at 19:40

## 2018-12-18 RX ADMIN — VALACYCLOVIR HYDROCHLORIDE SCH MG: 500 TABLET, FILM COATED ORAL at 10:07

## 2018-12-18 RX ADMIN — Medication SCH ML: at 17:34

## 2018-12-18 RX ADMIN — OLANZAPINE SCH MG: 5 TABLET ORAL at 21:02

## 2018-12-18 RX ADMIN — TAZOBACTAM SODIUM AND PIPERACILLIN SODIUM SCH MLS/HR: 375; 3 INJECTION, SOLUTION INTRAVENOUS at 14:12

## 2018-12-18 RX ADMIN — Medication SCH EACH: at 16:52

## 2018-12-18 RX ADMIN — TAZOBACTAM SODIUM AND PIPERACILLIN SODIUM SCH MLS/HR: 375; 3 INJECTION, SOLUTION INTRAVENOUS at 21:07

## 2018-12-18 RX ADMIN — Medication SCH ML: at 10:08

## 2018-12-18 RX ADMIN — SODIUM CHLORIDE PRN UNIT: 9 INJECTION, SOLUTION INTRAVENOUS at 16:51

## 2018-12-18 RX ADMIN — IPRATROPIUM BROMIDE SCH MG: 0.5 SOLUTION RESPIRATORY (INHALATION) at 13:32

## 2018-12-18 RX ADMIN — PREGABALIN SCH MG: 100 CAPSULE ORAL at 10:07

## 2018-12-18 RX ADMIN — IPRATROPIUM BROMIDE SCH MG: 0.5 SOLUTION RESPIRATORY (INHALATION) at 08:51

## 2018-12-18 RX ADMIN — PREGABALIN SCH MG: 100 CAPSULE ORAL at 16:49

## 2018-12-19 VITALS — DIASTOLIC BLOOD PRESSURE: 58 MMHG | SYSTOLIC BLOOD PRESSURE: 100 MMHG

## 2018-12-19 VITALS — SYSTOLIC BLOOD PRESSURE: 123 MMHG | DIASTOLIC BLOOD PRESSURE: 64 MMHG

## 2018-12-19 VITALS — SYSTOLIC BLOOD PRESSURE: 110 MMHG | DIASTOLIC BLOOD PRESSURE: 60 MMHG

## 2018-12-19 VITALS — SYSTOLIC BLOOD PRESSURE: 124 MMHG | DIASTOLIC BLOOD PRESSURE: 62 MMHG

## 2018-12-19 VITALS — SYSTOLIC BLOOD PRESSURE: 131 MMHG | DIASTOLIC BLOOD PRESSURE: 69 MMHG

## 2018-12-19 LAB
BASOPHILS # BLD AUTO: 0.1 K/UL (ref 0–8)
BASOPHILS NFR BLD AUTO: 0.6 % (ref 0–2)
BUN SERPL-MCNC: 16 MG/DL (ref 7–18)
CHLORIDE SERPL-SCNC: 107 MMOL/L (ref 98–107)
CO2 SERPL-SCNC: 30 MMOL/L (ref 21–32)
CREAT SERPL-MCNC: 1 MG/DL (ref 0.6–1.3)
EOSINOPHIL # BLD AUTO: 0.1 K/UL (ref 0–0.7)
EOSINOPHIL NFR BLD AUTO: 1.4 % (ref 0–7)
GLUCOSE SERPL-MCNC: 112 MG/DL (ref 74–106)
HCT VFR BLD AUTO: 30 % (ref 36.7–47.1)
HGB BLD-MCNC: 10.2 G/DL (ref 12.5–16.3)
LYMPHOCYTES # BLD AUTO: 1.8 K/UL (ref 20–40)
LYMPHOCYTES NFR BLD AUTO: 18.8 % (ref 20.5–51.5)
MAGNESIUM SERPL-MCNC: 2.1 MG/DL (ref 1.8–2.4)
MCH RBC QN AUTO: 32.4 UUG (ref 23.8–33.4)
MCHC RBC AUTO-ENTMCNC: 34 G/DL (ref 32.5–36.3)
MCV RBC AUTO: 94.8 FL (ref 73–96.2)
MONOCYTES # BLD AUTO: 0.9 K/UL (ref 2–10)
MONOCYTES NFR BLD AUTO: 9.5 % (ref 0–11)
NEUTROPHILS # BLD AUTO: 6.7 K/UL (ref 1.8–8.9)
NEUTROPHILS NFR BLD AUTO: 69.7 % (ref 38.5–71.5)
PHOSPHATE SERPL-MCNC: 3.5 MG/DL (ref 2.5–4.9)
PLATELET # BLD AUTO: 403 K/UL (ref 152–348)
POTASSIUM SERPL-SCNC: 4.8 MMOL/L (ref 3.5–5.1)
RBC # BLD AUTO: 3.17 MIL/UL (ref 4.06–5.63)
WBC # BLD AUTO: 9.5 K/UL (ref 3.6–10.2)

## 2018-12-19 RX ADMIN — Medication SCH ML: at 08:07

## 2018-12-19 RX ADMIN — ENOXAPARIN SODIUM SCH MG: 40 INJECTION SUBCUTANEOUS at 08:16

## 2018-12-19 RX ADMIN — Medication SCH EACH: at 08:07

## 2018-12-19 RX ADMIN — IPRATROPIUM BROMIDE SCH MG: 0.5 SOLUTION RESPIRATORY (INHALATION) at 19:30

## 2018-12-19 RX ADMIN — IPRATROPIUM BROMIDE SCH MG: 0.5 SOLUTION RESPIRATORY (INHALATION) at 15:12

## 2018-12-19 RX ADMIN — SODIUM CHLORIDE PRN UNIT: 9 INJECTION, SOLUTION INTRAVENOUS at 17:06

## 2018-12-19 RX ADMIN — Medication SCH EACH: at 17:00

## 2018-12-19 RX ADMIN — Medication SCH MG: at 12:27

## 2018-12-19 RX ADMIN — ALBUTEROL SULFATE SCH MG: 2.5 SOLUTION RESPIRATORY (INHALATION) at 07:43

## 2018-12-19 RX ADMIN — FLUTICASONE FUROATE AND VILANTEROL TRIFENATATE SCH EACH: 100; 25 POWDER RESPIRATORY (INHALATION) at 08:08

## 2018-12-19 RX ADMIN — ALBUTEROL SULFATE SCH MG: 2.5 SOLUTION RESPIRATORY (INHALATION) at 19:30

## 2018-12-19 RX ADMIN — LIDOCAINE SCH PATCH: 50 PATCH CUTANEOUS at 20:36

## 2018-12-19 RX ADMIN — Medication SCH ML: at 12:27

## 2018-12-19 RX ADMIN — THERA TABS SCH UDTAB: TAB at 08:12

## 2018-12-19 RX ADMIN — AMITRIPTYLINE HYDROCHLORIDE SCH MG: 50 TABLET, FILM COATED ORAL at 20:32

## 2018-12-19 RX ADMIN — Medication SCH CAP: at 12:27

## 2018-12-19 RX ADMIN — Medication SCH EACH: at 20:33

## 2018-12-19 RX ADMIN — Medication SCH EACH: at 12:27

## 2018-12-19 RX ADMIN — Medication SCH ML: at 17:17

## 2018-12-19 RX ADMIN — ALBUTEROL SULFATE SCH MG: 2.5 SOLUTION RESPIRATORY (INHALATION) at 15:12

## 2018-12-19 RX ADMIN — PREGABALIN SCH MG: 100 CAPSULE ORAL at 17:14

## 2018-12-19 RX ADMIN — PREGABALIN SCH MG: 100 CAPSULE ORAL at 08:11

## 2018-12-19 RX ADMIN — SODIUM CHLORIDE PRN UNIT: 9 INJECTION, SOLUTION INTRAVENOUS at 12:30

## 2018-12-19 RX ADMIN — TAZOBACTAM SODIUM AND PIPERACILLIN SODIUM SCH MLS/HR: 375; 3 INJECTION, SOLUTION INTRAVENOUS at 21:43

## 2018-12-19 RX ADMIN — Medication SCH MG: at 12:26

## 2018-12-19 RX ADMIN — TAZOBACTAM SODIUM AND PIPERACILLIN SODIUM SCH MLS/HR: 375; 3 INJECTION, SOLUTION INTRAVENOUS at 05:12

## 2018-12-19 RX ADMIN — Medication SCH CAP: at 08:10

## 2018-12-19 RX ADMIN — DEXTROSE SCH MLS/HR: 5 SOLUTION INTRAVENOUS at 16:57

## 2018-12-19 RX ADMIN — Medication SCH MG: at 20:34

## 2018-12-19 RX ADMIN — IPRATROPIUM BROMIDE SCH MG: 0.5 SOLUTION RESPIRATORY (INHALATION) at 07:43

## 2018-12-19 RX ADMIN — TAZOBACTAM SODIUM AND PIPERACILLIN SODIUM SCH MLS/HR: 375; 3 INJECTION, SOLUTION INTRAVENOUS at 14:55

## 2018-12-19 RX ADMIN — Medication SCH MG: at 08:12

## 2018-12-19 RX ADMIN — OLANZAPINE SCH MG: 5 TABLET ORAL at 20:34

## 2018-12-19 RX ADMIN — VALACYCLOVIR HYDROCHLORIDE SCH MG: 500 TABLET, FILM COATED ORAL at 08:12

## 2018-12-19 RX ADMIN — SODIUM CHLORIDE PRN UNIT: 9 INJECTION, SOLUTION INTRAVENOUS at 21:33

## 2018-12-19 RX ADMIN — Medication SCH CAP: at 17:00

## 2018-12-20 VITALS — DIASTOLIC BLOOD PRESSURE: 62 MMHG | SYSTOLIC BLOOD PRESSURE: 114 MMHG

## 2018-12-20 VITALS — DIASTOLIC BLOOD PRESSURE: 70 MMHG | SYSTOLIC BLOOD PRESSURE: 133 MMHG

## 2018-12-20 VITALS — SYSTOLIC BLOOD PRESSURE: 109 MMHG | DIASTOLIC BLOOD PRESSURE: 61 MMHG

## 2018-12-20 VITALS — DIASTOLIC BLOOD PRESSURE: 72 MMHG | SYSTOLIC BLOOD PRESSURE: 133 MMHG

## 2018-12-20 VITALS — DIASTOLIC BLOOD PRESSURE: 61 MMHG | SYSTOLIC BLOOD PRESSURE: 115 MMHG

## 2018-12-20 VITALS — DIASTOLIC BLOOD PRESSURE: 64 MMHG | SYSTOLIC BLOOD PRESSURE: 108 MMHG

## 2018-12-20 LAB
BASOPHILS # BLD AUTO: 0.1 K/UL (ref 0–8)
BASOPHILS NFR BLD AUTO: 0.9 % (ref 0–2)
BUN SERPL-MCNC: 13 MG/DL (ref 7–18)
CHLORIDE SERPL-SCNC: 107 MMOL/L (ref 98–107)
CO2 SERPL-SCNC: 30 MMOL/L (ref 21–32)
CREAT SERPL-MCNC: 0.9 MG/DL (ref 0.6–1.3)
EOSINOPHIL # BLD AUTO: 0.1 K/UL (ref 0–0.7)
EOSINOPHIL NFR BLD AUTO: 1.7 % (ref 0–7)
GLUCOSE SERPL-MCNC: 111 MG/DL (ref 74–106)
HCT VFR BLD AUTO: 28.5 % (ref 36.7–47.1)
HGB BLD-MCNC: 9.8 G/DL (ref 12.5–16.3)
LYMPHOCYTES # BLD AUTO: 2 K/UL (ref 20–40)
LYMPHOCYTES NFR BLD AUTO: 22.8 % (ref 20.5–51.5)
MAGNESIUM SERPL-MCNC: 1.7 MG/DL (ref 1.8–2.4)
MCH RBC QN AUTO: 31.5 UUG (ref 23.8–33.4)
MCHC RBC AUTO-ENTMCNC: 34 G/DL (ref 32.5–36.3)
MCV RBC AUTO: 92.1 FL (ref 73–96.2)
MONOCYTES # BLD AUTO: 0.9 K/UL (ref 2–10)
MONOCYTES NFR BLD AUTO: 10.4 % (ref 0–11)
NEUTROPHILS # BLD AUTO: 5.7 K/UL (ref 1.8–8.9)
NEUTROPHILS NFR BLD AUTO: 64.2 % (ref 38.5–71.5)
PHOSPHATE SERPL-MCNC: 4.1 MG/DL (ref 2.5–4.9)
PLATELET # BLD AUTO: 407 K/UL (ref 152–348)
POTASSIUM SERPL-SCNC: 4.2 MMOL/L (ref 3.5–5.1)
RBC # BLD AUTO: 3.09 MIL/UL (ref 4.06–5.63)
WBC # BLD AUTO: 8.9 K/UL (ref 3.6–10.2)

## 2018-12-20 PROCEDURE — HZ59ZZZ INDIVIDUAL PSYCHOTHERAPY FOR SUBSTANCE ABUSE TREATMENT, SUPPORTIVE: ICD-10-PCS

## 2018-12-20 RX ADMIN — DEXTROSE SCH MLS/HR: 5 SOLUTION INTRAVENOUS at 14:42

## 2018-12-20 RX ADMIN — ALBUTEROL SULFATE SCH MG: 2.5 SOLUTION RESPIRATORY (INHALATION) at 13:21

## 2018-12-20 RX ADMIN — Medication SCH EACH: at 20:28

## 2018-12-20 RX ADMIN — OLANZAPINE SCH MG: 5 TABLET ORAL at 20:20

## 2018-12-20 RX ADMIN — SODIUM CHLORIDE PRN UNIT: 9 INJECTION, SOLUTION INTRAVENOUS at 20:36

## 2018-12-20 RX ADMIN — Medication SCH MG: at 08:25

## 2018-12-20 RX ADMIN — Medication SCH CAP: at 08:25

## 2018-12-20 RX ADMIN — IPRATROPIUM BROMIDE SCH MG: 0.5 SOLUTION RESPIRATORY (INHALATION) at 07:27

## 2018-12-20 RX ADMIN — SODIUM CHLORIDE PRN UNIT: 9 INJECTION, SOLUTION INTRAVENOUS at 12:59

## 2018-12-20 RX ADMIN — Medication SCH ML: at 11:36

## 2018-12-20 RX ADMIN — TAZOBACTAM SODIUM AND PIPERACILLIN SODIUM SCH MLS/HR: 375; 3 INJECTION, SOLUTION INTRAVENOUS at 22:18

## 2018-12-20 RX ADMIN — Medication SCH EACH: at 07:39

## 2018-12-20 RX ADMIN — PREGABALIN SCH MG: 100 CAPSULE ORAL at 16:35

## 2018-12-20 RX ADMIN — VALACYCLOVIR HYDROCHLORIDE SCH MG: 500 TABLET, FILM COATED ORAL at 08:25

## 2018-12-20 RX ADMIN — ENOXAPARIN SODIUM SCH MG: 40 INJECTION SUBCUTANEOUS at 08:28

## 2018-12-20 RX ADMIN — Medication SCH CAP: at 12:58

## 2018-12-20 RX ADMIN — SODIUM CHLORIDE PRN UNIT: 9 INJECTION, SOLUTION INTRAVENOUS at 17:21

## 2018-12-20 RX ADMIN — ALBUTEROL SULFATE SCH MG: 2.5 SOLUTION RESPIRATORY (INHALATION) at 07:26

## 2018-12-20 RX ADMIN — CLONIDINE HYDROCHLORIDE PRN MG: 0.1 TABLET ORAL at 12:58

## 2018-12-20 RX ADMIN — Medication SCH ML: at 16:35

## 2018-12-20 RX ADMIN — ALBUTEROL SULFATE SCH MG: 2.5 SOLUTION RESPIRATORY (INHALATION) at 19:30

## 2018-12-20 RX ADMIN — AMITRIPTYLINE HYDROCHLORIDE SCH MG: 50 TABLET, FILM COATED ORAL at 20:21

## 2018-12-20 RX ADMIN — TAZOBACTAM SODIUM AND PIPERACILLIN SODIUM SCH MLS/HR: 375; 3 INJECTION, SOLUTION INTRAVENOUS at 13:53

## 2018-12-20 RX ADMIN — KETOROLAC TROMETHAMINE PRN MG: 30 INJECTION, SOLUTION INTRAMUSCULAR; INTRAVENOUS at 07:44

## 2018-12-20 RX ADMIN — THERA TABS SCH UDTAB: TAB at 08:26

## 2018-12-20 RX ADMIN — Medication SCH MG: at 20:20

## 2018-12-20 RX ADMIN — Medication SCH CAP: at 16:35

## 2018-12-20 RX ADMIN — IPRATROPIUM BROMIDE SCH MG: 0.5 SOLUTION RESPIRATORY (INHALATION) at 13:21

## 2018-12-20 RX ADMIN — Medication SCH EACH: at 11:35

## 2018-12-20 RX ADMIN — TAZOBACTAM SODIUM AND PIPERACILLIN SODIUM SCH MLS/HR: 375; 3 INJECTION, SOLUTION INTRAVENOUS at 06:14

## 2018-12-20 RX ADMIN — PREGABALIN SCH MG: 100 CAPSULE ORAL at 08:25

## 2018-12-20 RX ADMIN — Medication SCH ML: at 08:29

## 2018-12-20 RX ADMIN — Medication SCH MG: at 08:26

## 2018-12-20 RX ADMIN — IPRATROPIUM BROMIDE SCH MG: 0.5 SOLUTION RESPIRATORY (INHALATION) at 19:30

## 2018-12-20 RX ADMIN — HYDROXYZINE PAMOATE PRN MG: 25 CAPSULE ORAL at 12:58

## 2018-12-20 RX ADMIN — Medication SCH EACH: at 16:35

## 2018-12-20 RX ADMIN — FLUTICASONE FUROATE AND VILANTEROL TRIFENATATE SCH EACH: 100; 25 POWDER RESPIRATORY (INHALATION) at 09:14

## 2018-12-20 RX ADMIN — LIDOCAINE SCH PATCH: 50 PATCH CUTANEOUS at 20:41

## 2018-12-21 VITALS — SYSTOLIC BLOOD PRESSURE: 121 MMHG | DIASTOLIC BLOOD PRESSURE: 62 MMHG

## 2018-12-21 VITALS — DIASTOLIC BLOOD PRESSURE: 67 MMHG | SYSTOLIC BLOOD PRESSURE: 127 MMHG

## 2018-12-21 VITALS — DIASTOLIC BLOOD PRESSURE: 76 MMHG | SYSTOLIC BLOOD PRESSURE: 147 MMHG

## 2018-12-21 VITALS — DIASTOLIC BLOOD PRESSURE: 64 MMHG | SYSTOLIC BLOOD PRESSURE: 130 MMHG

## 2018-12-21 VITALS — SYSTOLIC BLOOD PRESSURE: 144 MMHG | DIASTOLIC BLOOD PRESSURE: 82 MMHG

## 2018-12-21 LAB
ALP SERPL-CCNC: 86 U/L (ref 50–136)
ALT SERPL W/O P-5'-P-CCNC: 32 U/L (ref 16–63)
AST SERPL-CCNC: 19 U/L (ref 15–37)
BASOPHILS # BLD AUTO: 0.1 K/UL (ref 0–8)
BASOPHILS NFR BLD AUTO: 1 % (ref 0–2)
BILIRUB SERPL-MCNC: 0.2 MG/DL (ref 0.2–1)
BUN SERPL-MCNC: 19 MG/DL (ref 7–18)
CHLORIDE SERPL-SCNC: 107 MMOL/L (ref 98–107)
CO2 SERPL-SCNC: 30 MMOL/L (ref 21–32)
CREAT SERPL-MCNC: 0.9 MG/DL (ref 0.6–1.3)
EOSINOPHIL # BLD AUTO: 0.2 K/UL (ref 0–0.7)
EOSINOPHIL NFR BLD AUTO: 2 % (ref 0–7)
GLUCOSE SERPL-MCNC: 132 MG/DL (ref 74–106)
HCT VFR BLD AUTO: 30.9 % (ref 36.7–47.1)
HGB BLD-MCNC: 10.7 G/DL (ref 12.5–16.3)
LYMPHOCYTES # BLD AUTO: 1.8 K/UL (ref 20–40)
LYMPHOCYTES NFR BLD AUTO: 18.9 % (ref 20.5–51.5)
MAGNESIUM SERPL-MCNC: 1.9 MG/DL (ref 1.8–2.4)
MCH RBC QN AUTO: 31.9 UUG (ref 23.8–33.4)
MCHC RBC AUTO-ENTMCNC: 35 G/DL (ref 32.5–36.3)
MCV RBC AUTO: 92.3 FL (ref 73–96.2)
MONOCYTES # BLD AUTO: 0.9 K/UL (ref 2–10)
MONOCYTES NFR BLD AUTO: 9 % (ref 0–11)
NEUTROPHILS # BLD AUTO: 6.6 K/UL (ref 1.8–8.9)
NEUTROPHILS NFR BLD AUTO: 69.1 % (ref 38.5–71.5)
PLATELET # BLD AUTO: 447 K/UL (ref 152–348)
POTASSIUM SERPL-SCNC: 4.4 MMOL/L (ref 3.5–5.1)
RBC # BLD AUTO: 3.35 MIL/UL (ref 4.06–5.63)
WBC # BLD AUTO: 9.6 K/UL (ref 3.6–10.2)
WS STN SPEC: 6.2 G/DL (ref 6.4–8.2)

## 2018-12-21 RX ADMIN — HYDROXYZINE PAMOATE PRN MG: 25 CAPSULE ORAL at 03:48

## 2018-12-21 RX ADMIN — ENOXAPARIN SODIUM SCH MG: 40 INJECTION SUBCUTANEOUS at 08:53

## 2018-12-21 RX ADMIN — SODIUM CHLORIDE PRN UNIT: 9 INJECTION, SOLUTION INTRAVENOUS at 20:20

## 2018-12-21 RX ADMIN — Medication SCH EACH: at 07:34

## 2018-12-21 RX ADMIN — LIDOCAINE SCH PATCH: 50 PATCH CUTANEOUS at 20:21

## 2018-12-21 RX ADMIN — IPRATROPIUM BROMIDE SCH MG: 0.5 SOLUTION RESPIRATORY (INHALATION) at 13:30

## 2018-12-21 RX ADMIN — TAZOBACTAM SODIUM AND PIPERACILLIN SODIUM SCH MLS/HR: 375; 3 INJECTION, SOLUTION INTRAVENOUS at 21:13

## 2018-12-21 RX ADMIN — Medication SCH CAP: at 16:45

## 2018-12-21 RX ADMIN — Medication SCH ML: at 07:37

## 2018-12-21 RX ADMIN — Medication SCH EACH: at 20:24

## 2018-12-21 RX ADMIN — Medication SCH ML: at 16:48

## 2018-12-21 RX ADMIN — ALBUTEROL SULFATE SCH MG: 2.5 SOLUTION RESPIRATORY (INHALATION) at 07:35

## 2018-12-21 RX ADMIN — Medication SCH CAP: at 12:13

## 2018-12-21 RX ADMIN — TAZOBACTAM SODIUM AND PIPERACILLIN SODIUM SCH MLS/HR: 375; 3 INJECTION, SOLUTION INTRAVENOUS at 06:11

## 2018-12-21 RX ADMIN — THERA TABS SCH UDTAB: TAB at 08:53

## 2018-12-21 RX ADMIN — PREGABALIN SCH MG: 100 CAPSULE ORAL at 16:45

## 2018-12-21 RX ADMIN — IPRATROPIUM BROMIDE SCH MG: 0.5 SOLUTION RESPIRATORY (INHALATION) at 20:16

## 2018-12-21 RX ADMIN — Medication SCH MG: at 08:53

## 2018-12-21 RX ADMIN — Medication SCH MG: at 20:21

## 2018-12-21 RX ADMIN — CLONIDINE HYDROCHLORIDE PRN MG: 0.1 TABLET ORAL at 09:19

## 2018-12-21 RX ADMIN — Medication SCH CAP: at 08:53

## 2018-12-21 RX ADMIN — TAZOBACTAM SODIUM AND PIPERACILLIN SODIUM SCH MLS/HR: 375; 3 INJECTION, SOLUTION INTRAVENOUS at 14:23

## 2018-12-21 RX ADMIN — ALBUTEROL SULFATE SCH MG: 2.5 SOLUTION RESPIRATORY (INHALATION) at 20:16

## 2018-12-21 RX ADMIN — HYDROXYZINE PAMOATE PRN MG: 25 CAPSULE ORAL at 09:18

## 2018-12-21 RX ADMIN — Medication SCH ML: at 12:13

## 2018-12-21 RX ADMIN — ALBUTEROL SULFATE SCH MG: 2.5 SOLUTION RESPIRATORY (INHALATION) at 13:30

## 2018-12-21 RX ADMIN — IPRATROPIUM BROMIDE SCH MG: 0.5 SOLUTION RESPIRATORY (INHALATION) at 07:35

## 2018-12-21 RX ADMIN — Medication SCH EACH: at 16:30

## 2018-12-21 RX ADMIN — OLANZAPINE SCH MG: 5 TABLET ORAL at 20:21

## 2018-12-21 RX ADMIN — KETOROLAC TROMETHAMINE PRN MG: 30 INJECTION, SOLUTION INTRAMUSCULAR; INTRAVENOUS at 07:34

## 2018-12-21 RX ADMIN — AMITRIPTYLINE HYDROCHLORIDE SCH MG: 50 TABLET, FILM COATED ORAL at 20:21

## 2018-12-21 RX ADMIN — VALACYCLOVIR HYDROCHLORIDE SCH MG: 500 TABLET, FILM COATED ORAL at 08:52

## 2018-12-21 RX ADMIN — SODIUM CHLORIDE PRN UNIT: 9 INJECTION, SOLUTION INTRAVENOUS at 08:54

## 2018-12-21 RX ADMIN — FLUTICASONE FUROATE AND VILANTEROL TRIFENATATE SCH EACH: 100; 25 POWDER RESPIRATORY (INHALATION) at 08:52

## 2018-12-21 RX ADMIN — CLONIDINE HYDROCHLORIDE PRN MG: 0.1 TABLET ORAL at 03:47

## 2018-12-21 RX ADMIN — PREGABALIN SCH MG: 100 CAPSULE ORAL at 08:53

## 2018-12-21 RX ADMIN — Medication SCH EACH: at 11:30

## 2018-12-22 VITALS — SYSTOLIC BLOOD PRESSURE: 148 MMHG | DIASTOLIC BLOOD PRESSURE: 78 MMHG

## 2018-12-22 RX ADMIN — Medication SCH EACH: at 07:41

## 2018-12-22 RX ADMIN — HYDROXYZINE PAMOATE PRN MG: 25 CAPSULE ORAL at 08:03

## 2018-12-22 RX ADMIN — SODIUM CHLORIDE PRN UNIT: 9 INJECTION, SOLUTION INTRAVENOUS at 07:53

## 2018-12-22 RX ADMIN — METHOCARBAMOL TABLETS PRN MG: 750 TABLET, COATED ORAL at 08:04

## 2018-12-22 RX ADMIN — Medication SCH ML: at 08:12

## 2018-12-22 RX ADMIN — ENOXAPARIN SODIUM SCH MG: 40 INJECTION SUBCUTANEOUS at 08:05

## 2018-12-22 RX ADMIN — TAZOBACTAM SODIUM AND PIPERACILLIN SODIUM SCH MLS/HR: 375; 3 INJECTION, SOLUTION INTRAVENOUS at 05:27

## 2018-12-22 RX ADMIN — ALBUTEROL SULFATE SCH MG: 2.5 SOLUTION RESPIRATORY (INHALATION) at 07:35

## 2018-12-22 RX ADMIN — CLONIDINE HYDROCHLORIDE PRN MG: 0.1 TABLET ORAL at 08:04

## 2018-12-22 RX ADMIN — VALACYCLOVIR HYDROCHLORIDE SCH MG: 500 TABLET, FILM COATED ORAL at 08:04

## 2018-12-22 RX ADMIN — PREGABALIN SCH MG: 100 CAPSULE ORAL at 08:03

## 2018-12-22 RX ADMIN — Medication SCH MG: at 08:03

## 2018-12-22 RX ADMIN — Medication SCH MG: at 08:04

## 2018-12-22 RX ADMIN — FLUTICASONE FUROATE AND VILANTEROL TRIFENATATE SCH EACH: 100; 25 POWDER RESPIRATORY (INHALATION) at 08:03

## 2018-12-22 RX ADMIN — THERA TABS SCH UDTAB: TAB at 08:12

## 2018-12-22 RX ADMIN — Medication SCH CAP: at 08:03

## 2018-12-22 RX ADMIN — IPRATROPIUM BROMIDE SCH MG: 0.5 SOLUTION RESPIRATORY (INHALATION) at 07:35

## 2023-04-06 ENCOUNTER — HOSPITAL ENCOUNTER (INPATIENT)
Dept: HOSPITAL 12 - ER | Age: 63
LOS: 13 days | Discharge: OTHER INSTITUTION | DRG: 917 | End: 2023-04-19
Payer: COMMERCIAL

## 2023-04-06 VITALS — SYSTOLIC BLOOD PRESSURE: 119 MMHG | DIASTOLIC BLOOD PRESSURE: 67 MMHG

## 2023-04-06 VITALS — BODY MASS INDEX: 22.28 KG/M2 | WEIGHT: 147 LBS | HEIGHT: 68 IN

## 2023-04-06 VITALS — DIASTOLIC BLOOD PRESSURE: 66 MMHG | SYSTOLIC BLOOD PRESSURE: 107 MMHG

## 2023-04-06 VITALS — DIASTOLIC BLOOD PRESSURE: 91 MMHG | SYSTOLIC BLOOD PRESSURE: 125 MMHG

## 2023-04-06 VITALS — DIASTOLIC BLOOD PRESSURE: 48 MMHG | SYSTOLIC BLOOD PRESSURE: 116 MMHG

## 2023-04-06 VITALS — SYSTOLIC BLOOD PRESSURE: 112 MMHG | DIASTOLIC BLOOD PRESSURE: 70 MMHG

## 2023-04-06 VITALS — DIASTOLIC BLOOD PRESSURE: 61 MMHG | SYSTOLIC BLOOD PRESSURE: 109 MMHG

## 2023-04-06 VITALS — DIASTOLIC BLOOD PRESSURE: 69 MMHG | SYSTOLIC BLOOD PRESSURE: 122 MMHG

## 2023-04-06 VITALS — DIASTOLIC BLOOD PRESSURE: 60 MMHG | SYSTOLIC BLOOD PRESSURE: 113 MMHG

## 2023-04-06 VITALS — SYSTOLIC BLOOD PRESSURE: 121 MMHG | DIASTOLIC BLOOD PRESSURE: 64 MMHG

## 2023-04-06 VITALS — DIASTOLIC BLOOD PRESSURE: 38 MMHG | SYSTOLIC BLOOD PRESSURE: 87 MMHG

## 2023-04-06 VITALS — SYSTOLIC BLOOD PRESSURE: 119 MMHG | DIASTOLIC BLOOD PRESSURE: 71 MMHG

## 2023-04-06 VITALS — DIASTOLIC BLOOD PRESSURE: 73 MMHG | SYSTOLIC BLOOD PRESSURE: 131 MMHG

## 2023-04-06 VITALS — SYSTOLIC BLOOD PRESSURE: 114 MMHG | DIASTOLIC BLOOD PRESSURE: 67 MMHG

## 2023-04-06 VITALS — SYSTOLIC BLOOD PRESSURE: 136 MMHG | DIASTOLIC BLOOD PRESSURE: 74 MMHG

## 2023-04-06 VITALS — SYSTOLIC BLOOD PRESSURE: 128 MMHG | DIASTOLIC BLOOD PRESSURE: 57 MMHG

## 2023-04-06 VITALS — DIASTOLIC BLOOD PRESSURE: 76 MMHG | SYSTOLIC BLOOD PRESSURE: 131 MMHG

## 2023-04-06 VITALS — DIASTOLIC BLOOD PRESSURE: 74 MMHG | SYSTOLIC BLOOD PRESSURE: 130 MMHG

## 2023-04-06 VITALS — SYSTOLIC BLOOD PRESSURE: 127 MMHG | DIASTOLIC BLOOD PRESSURE: 69 MMHG

## 2023-04-06 VITALS — DIASTOLIC BLOOD PRESSURE: 76 MMHG | SYSTOLIC BLOOD PRESSURE: 124 MMHG

## 2023-04-06 VITALS — DIASTOLIC BLOOD PRESSURE: 67 MMHG | SYSTOLIC BLOOD PRESSURE: 119 MMHG

## 2023-04-06 VITALS — SYSTOLIC BLOOD PRESSURE: 130 MMHG | DIASTOLIC BLOOD PRESSURE: 73 MMHG

## 2023-04-06 VITALS — SYSTOLIC BLOOD PRESSURE: 104 MMHG | DIASTOLIC BLOOD PRESSURE: 71 MMHG

## 2023-04-06 VITALS — DIASTOLIC BLOOD PRESSURE: 71 MMHG | SYSTOLIC BLOOD PRESSURE: 127 MMHG

## 2023-04-06 VITALS — DIASTOLIC BLOOD PRESSURE: 72 MMHG | SYSTOLIC BLOOD PRESSURE: 121 MMHG

## 2023-04-06 VITALS — SYSTOLIC BLOOD PRESSURE: 143 MMHG | DIASTOLIC BLOOD PRESSURE: 68 MMHG

## 2023-04-06 VITALS — SYSTOLIC BLOOD PRESSURE: 141 MMHG | DIASTOLIC BLOOD PRESSURE: 33 MMHG

## 2023-04-06 VITALS — SYSTOLIC BLOOD PRESSURE: 99 MMHG | DIASTOLIC BLOOD PRESSURE: 69 MMHG

## 2023-04-06 DIAGNOSIS — J96.21: ICD-10-CM

## 2023-04-06 DIAGNOSIS — M43.12: ICD-10-CM

## 2023-04-06 DIAGNOSIS — E78.5: ICD-10-CM

## 2023-04-06 DIAGNOSIS — E11.40: ICD-10-CM

## 2023-04-06 DIAGNOSIS — Z20.822: ICD-10-CM

## 2023-04-06 DIAGNOSIS — N17.0: ICD-10-CM

## 2023-04-06 DIAGNOSIS — G93.41: ICD-10-CM

## 2023-04-06 DIAGNOSIS — F29: ICD-10-CM

## 2023-04-06 DIAGNOSIS — Z79.899: ICD-10-CM

## 2023-04-06 DIAGNOSIS — Z85.028: ICD-10-CM

## 2023-04-06 DIAGNOSIS — Z75.1: ICD-10-CM

## 2023-04-06 DIAGNOSIS — E87.1: ICD-10-CM

## 2023-04-06 DIAGNOSIS — Z98.1: ICD-10-CM

## 2023-04-06 DIAGNOSIS — E87.5: ICD-10-CM

## 2023-04-06 DIAGNOSIS — G92.8: ICD-10-CM

## 2023-04-06 DIAGNOSIS — T40.5X1A: ICD-10-CM

## 2023-04-06 DIAGNOSIS — T50.901A: Primary | ICD-10-CM

## 2023-04-06 DIAGNOSIS — Z79.4: ICD-10-CM

## 2023-04-06 DIAGNOSIS — I10: ICD-10-CM

## 2023-04-06 DIAGNOSIS — F19.10: ICD-10-CM
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
## 2023-04-06 DIAGNOSIS — R57.1: ICD-10-CM

## 2023-04-06 DIAGNOSIS — Z90.3: ICD-10-CM

## 2023-04-06 DIAGNOSIS — Z79.84: ICD-10-CM

## 2023-04-06 DIAGNOSIS — J69.0: ICD-10-CM

## 2023-04-06 DIAGNOSIS — F14.10: ICD-10-CM

## 2023-04-06 DIAGNOSIS — M48.02: ICD-10-CM

## 2023-04-06 LAB
ALP SERPL-CCNC: 90 U/L (ref 50–136)
ALT SERPL W/O P-5'-P-CCNC: 28 U/L (ref 16–63)
AMPHETAMINES UR QL SCN>1000 NG/ML: NEGATIVE
APAP SERPL-MCNC: < 2 UG/ML (ref 10–30)
APPEARANCE UR: CLEAR
AST SERPL-CCNC: 17 U/L (ref 15–37)
BILIRUB DIRECT SERPL-MCNC: 0.1 MG/DL (ref 0–0.2)
BILIRUB SERPL-MCNC: 0.4 MG/DL (ref 0.2–1)
BILIRUB UR QL STRIP: NEGATIVE
BUN SERPL-MCNC: 36 MG/DL (ref 7–18)
CHLORIDE SERPL-SCNC: 103 MMOL/L (ref 98–107)
CO2 SERPL-SCNC: 22 MMOL/L (ref 21–32)
COCAINE UR QL SCN: POSITIVE
COLOR UR: YELLOW
CREAT SERPL-MCNC: 2.3 MG/DL (ref 0.6–1.3)
DEPRECATED SQUAMOUS URNS QL MICRO: (no result) /HPF
ETHANOL SERPL-MCNC: < 3 MG/DL (ref 0–0)
GLUCOSE SERPL-MCNC: 255 MG/DL (ref 74–106)
GLUCOSE UR STRIP-MCNC: (no result) MG/DL
HCT VFR BLD AUTO: 39.4 % (ref 36.7–47.1)
HGB UR QL STRIP: NEGATIVE
KETONES UR STRIP-MCNC: NEGATIVE MG/DL
LEUKOCYTE ESTERASE UR QL STRIP: NEGATIVE
MCH RBC QN AUTO: 30.8 UUG (ref 23.8–33.4)
MCV RBC AUTO: 96.2 FL (ref 73–96.2)
NITRITE UR QL STRIP: NEGATIVE
PCP UR QL SCN>25 NG/ML: NEGATIVE
PH UR STRIP: 5.5 [PH] (ref 5–8)
PLATELET # BLD AUTO: 355 K/UL (ref 152–348)
POTASSIUM SERPL-SCNC: 5.2 MMOL/L (ref 3.5–5.1)
RBC #/AREA URNS HPF: (no result) /HPF (ref 0–3)
SP GR UR STRIP: 1.02 (ref 1–1.03)
THC UR QL SCN>50 NG/ML: NEGATIVE
TSH SERPL DL<=0.005 MIU/L-ACNC: 0.68 MIU/ML (ref 0.36–3.74)
UROBILINOGEN UR STRIP-MCNC: 0.2 E.U./DL
WBC #/AREA URNS HPF: (no result) /HPF
WBC #/AREA URNS HPF: (no result) /HPF (ref 0–3)
WS STN SPEC: 6.9 G/DL (ref 6.4–8.2)

## 2023-04-06 PROCEDURE — A6213 FOAM DRG >16<=48 SQ IN W/BDR: HCPCS

## 2023-04-06 PROCEDURE — A4663 DIALYSIS BLOOD PRESSURE CUFF: HCPCS

## 2023-04-06 PROCEDURE — G0378 HOSPITAL OBSERVATION PER HR: HCPCS

## 2023-04-06 PROCEDURE — C1758 CATHETER, URETERAL: HCPCS

## 2023-04-06 PROCEDURE — A6209 FOAM DRSG <=16 SQ IN W/O BDR: HCPCS

## 2023-04-06 PROCEDURE — G0480 DRUG TEST DEF 1-7 CLASSES: HCPCS

## 2023-04-06 RX ADMIN — Medication SCH EACH: at 22:24

## 2023-04-06 RX ADMIN — SODIUM CHLORIDE PRN UNIT: 9 INJECTION, SOLUTION INTRAVENOUS at 21:36

## 2023-04-06 RX ADMIN — DEXTROSE SCH MLS/HR: 50 INJECTION, SOLUTION INTRAVENOUS at 21:17

## 2023-04-06 RX ADMIN — DEXTROSE SCH MLS/HR: 50 INJECTION, SOLUTION INTRAVENOUS at 14:20

## 2023-04-06 RX ADMIN — Medication SCH EACH: at 15:56

## 2023-04-06 RX ADMIN — INSULIN GLARGINE SCH UNITS: 100 INJECTION, SOLUTION SUBCUTANEOUS at 21:34

## 2023-04-06 RX ADMIN — DEXTROSE AND SODIUM CHLORIDE PRN MLS/HR: 5; .9 INJECTION, SOLUTION INTRAVENOUS at 20:00

## 2023-04-06 RX ADMIN — SODIUM CHLORIDE PRN UNIT: 9 INJECTION, SOLUTION INTRAVENOUS at 22:00

## 2023-04-06 RX ADMIN — Medication SCH EACH: at 21:00

## 2023-04-07 VITALS — DIASTOLIC BLOOD PRESSURE: 71 MMHG | SYSTOLIC BLOOD PRESSURE: 131 MMHG

## 2023-04-07 VITALS — SYSTOLIC BLOOD PRESSURE: 114 MMHG | DIASTOLIC BLOOD PRESSURE: 64 MMHG

## 2023-04-07 VITALS — SYSTOLIC BLOOD PRESSURE: 123 MMHG | DIASTOLIC BLOOD PRESSURE: 63 MMHG

## 2023-04-07 VITALS — SYSTOLIC BLOOD PRESSURE: 115 MMHG | DIASTOLIC BLOOD PRESSURE: 69 MMHG

## 2023-04-07 VITALS — DIASTOLIC BLOOD PRESSURE: 64 MMHG | SYSTOLIC BLOOD PRESSURE: 131 MMHG

## 2023-04-07 VITALS — DIASTOLIC BLOOD PRESSURE: 67 MMHG | SYSTOLIC BLOOD PRESSURE: 118 MMHG

## 2023-04-07 VITALS — DIASTOLIC BLOOD PRESSURE: 56 MMHG | SYSTOLIC BLOOD PRESSURE: 125 MMHG

## 2023-04-07 VITALS — DIASTOLIC BLOOD PRESSURE: 61 MMHG | SYSTOLIC BLOOD PRESSURE: 112 MMHG

## 2023-04-07 VITALS — SYSTOLIC BLOOD PRESSURE: 101 MMHG | DIASTOLIC BLOOD PRESSURE: 52 MMHG

## 2023-04-07 VITALS — DIASTOLIC BLOOD PRESSURE: 68 MMHG | SYSTOLIC BLOOD PRESSURE: 119 MMHG

## 2023-04-07 LAB
BUN SERPL-MCNC: 39 MG/DL (ref 7–18)
CHLORIDE SERPL-SCNC: 106 MMOL/L (ref 98–107)
CO2 SERPL-SCNC: 23 MMOL/L (ref 21–32)
CREAT SERPL-MCNC: 1.4 MG/DL (ref 0.6–1.3)
GLUCOSE SERPL-MCNC: 105 MG/DL (ref 74–106)
HCT VFR BLD AUTO: 31.2 % (ref 36.7–47.1)
MAGNESIUM SERPL-MCNC: 1.6 MG/DL (ref 1.8–2.4)
MCH RBC QN AUTO: 30.7 UUG (ref 23.8–33.4)
MCV RBC AUTO: 93.6 FL (ref 73–96.2)
PHOSPHATE SERPL-MCNC: 3.7 MG/DL (ref 2.5–4.9)
PLATELET # BLD AUTO: 249 K/UL (ref 152–348)
POTASSIUM SERPL-SCNC: 3.9 MMOL/L (ref 3.5–5.1)

## 2023-04-07 RX ADMIN — MAGNESIUM SULFATE IN DEXTROSE SCH MLS/HR: 10 INJECTION, SOLUTION INTRAVENOUS at 10:45

## 2023-04-07 RX ADMIN — MAGNESIUM SULFATE IN DEXTROSE SCH MLS/HR: 10 INJECTION, SOLUTION INTRAVENOUS at 09:45

## 2023-04-07 RX ADMIN — MAGNESIUM SULFATE IN DEXTROSE SCH MLS/HR: 10 INJECTION, SOLUTION INTRAVENOUS at 17:17

## 2023-04-07 RX ADMIN — Medication SCH EACH: at 16:26

## 2023-04-07 RX ADMIN — Medication SCH EACH: at 11:24

## 2023-04-07 RX ADMIN — CIPROFLOXACIN HYDROCHLORIDE SCH ML: 3 SOLUTION/ DROPS OPHTHALMIC at 13:00

## 2023-04-07 RX ADMIN — MAGNESIUM SULFATE IN DEXTROSE SCH MLS/HR: 10 INJECTION, SOLUTION INTRAVENOUS at 16:25

## 2023-04-07 RX ADMIN — DEXTROSE SCH MLS/HR: 50 INJECTION, SOLUTION INTRAVENOUS at 14:00

## 2023-04-07 RX ADMIN — CIPROFLOXACIN HYDROCHLORIDE SCH ML: 3 SOLUTION/ DROPS OPHTHALMIC at 21:00

## 2023-04-07 RX ADMIN — DEXTROSE SCH MLS/HR: 50 INJECTION, SOLUTION INTRAVENOUS at 05:13

## 2023-04-07 RX ADMIN — HYDROCODONE BITARTRATE AND ACETAMINOPHEN PRN TAB: 5; 325 TABLET ORAL at 20:32

## 2023-04-07 RX ADMIN — CIPROFLOXACIN HYDROCHLORIDE SCH ML: 3 SOLUTION/ DROPS OPHTHALMIC at 16:25

## 2023-04-07 RX ADMIN — HYDROCODONE BITARTRATE AND ACETAMINOPHEN PRN TAB: 5; 325 TABLET ORAL at 14:42

## 2023-04-07 RX ADMIN — LORAZEPAM PRN MG: 1 TABLET ORAL at 14:41

## 2023-04-07 RX ADMIN — INSULIN GLARGINE SCH UNITS: 100 INJECTION, SOLUTION SUBCUTANEOUS at 21:00

## 2023-04-07 RX ADMIN — DEXTROSE SCH MLS/HR: 50 INJECTION, SOLUTION INTRAVENOUS at 22:00

## 2023-04-07 RX ADMIN — PANTOPRAZOLE SODIUM SCH MG: 40 TABLET, DELAYED RELEASE ORAL at 06:32

## 2023-04-07 RX ADMIN — Medication SCH EACH: at 06:25

## 2023-04-07 RX ADMIN — Medication SCH EACH: at 21:00

## 2023-04-07 RX ADMIN — Medication SCH EACH: at 11:30

## 2023-04-07 RX ADMIN — LORAZEPAM PRN MG: 1 TABLET ORAL at 20:26

## 2023-04-08 VITALS — DIASTOLIC BLOOD PRESSURE: 88 MMHG | SYSTOLIC BLOOD PRESSURE: 155 MMHG

## 2023-04-08 VITALS — DIASTOLIC BLOOD PRESSURE: 81 MMHG | SYSTOLIC BLOOD PRESSURE: 159 MMHG

## 2023-04-08 VITALS — DIASTOLIC BLOOD PRESSURE: 91 MMHG | SYSTOLIC BLOOD PRESSURE: 125 MMHG

## 2023-04-08 VITALS — SYSTOLIC BLOOD PRESSURE: 168 MMHG | DIASTOLIC BLOOD PRESSURE: 65 MMHG

## 2023-04-08 LAB
BUN SERPL-MCNC: 19 MG/DL (ref 7–18)
CHLORIDE SERPL-SCNC: 106 MMOL/L (ref 98–107)
CO2 SERPL-SCNC: 27 MMOL/L (ref 21–32)
CREAT SERPL-MCNC: 1 MG/DL (ref 0.6–1.3)
GLUCOSE SERPL-MCNC: 71 MG/DL (ref 74–106)
HCT VFR BLD AUTO: 33.2 % (ref 36.7–47.1)
MAGNESIUM SERPL-MCNC: 2 MG/DL (ref 1.8–2.4)
MCH RBC QN AUTO: 30.6 UUG (ref 23.8–33.4)
MCV RBC AUTO: 93.1 FL (ref 73–96.2)
PLATELET # BLD AUTO: 326 K/UL (ref 152–348)
POTASSIUM SERPL-SCNC: 3.8 MMOL/L (ref 3.5–5.1)

## 2023-04-08 RX ADMIN — DEXTROSE SCH MLS/HR: 50 INJECTION, SOLUTION INTRAVENOUS at 06:00

## 2023-04-08 RX ADMIN — Medication SCH EACH: at 07:22

## 2023-04-08 RX ADMIN — CIPROFLOXACIN HYDROCHLORIDE SCH ML: 3 SOLUTION/ DROPS OPHTHALMIC at 08:41

## 2023-04-08 RX ADMIN — Medication SCH EACH: at 17:08

## 2023-04-08 RX ADMIN — DEXTROSE SCH MLS/HR: 50 INJECTION, SOLUTION INTRAVENOUS at 14:21

## 2023-04-08 RX ADMIN — INSULIN GLARGINE SCH UNITS: 100 INJECTION, SOLUTION SUBCUTANEOUS at 20:47

## 2023-04-08 RX ADMIN — CIPROFLOXACIN HYDROCHLORIDE SCH DROP: 3 SOLUTION/ DROPS OPHTHALMIC at 20:49

## 2023-04-08 RX ADMIN — PANTOPRAZOLE SODIUM SCH MG: 40 TABLET, DELAYED RELEASE ORAL at 06:19

## 2023-04-08 RX ADMIN — DEXTROSE SCH MLS/HR: 50 INJECTION, SOLUTION INTRAVENOUS at 22:24

## 2023-04-08 RX ADMIN — HYDROCODONE BITARTRATE AND ACETAMINOPHEN PRN TAB: 5; 325 TABLET ORAL at 01:05

## 2023-04-08 RX ADMIN — CIPROFLOXACIN HYDROCHLORIDE SCH ML: 3 SOLUTION/ DROPS OPHTHALMIC at 17:09

## 2023-04-08 RX ADMIN — LORAZEPAM PRN MG: 2 INJECTION INTRAMUSCULAR; INTRAVENOUS at 10:20

## 2023-04-08 RX ADMIN — CIPROFLOXACIN HYDROCHLORIDE SCH ML: 3 SOLUTION/ DROPS OPHTHALMIC at 13:00

## 2023-04-08 RX ADMIN — LORAZEPAM PRN MG: 2 INJECTION INTRAMUSCULAR; INTRAVENOUS at 19:31

## 2023-04-08 RX ADMIN — DEXTROSE AND SODIUM CHLORIDE PRN MLS/HR: 5; .9 INJECTION, SOLUTION INTRAVENOUS at 18:37

## 2023-04-08 RX ADMIN — LORAZEPAM PRN MG: 2 INJECTION INTRAMUSCULAR; INTRAVENOUS at 23:45

## 2023-04-08 RX ADMIN — PANTOPRAZOLE SODIUM SCH MG: 40 TABLET, DELAYED RELEASE ORAL at 06:13

## 2023-04-08 RX ADMIN — Medication SCH EACH: at 20:48

## 2023-04-08 RX ADMIN — Medication SCH EACH: at 11:30

## 2023-04-08 RX ADMIN — LORAZEPAM PRN MG: 1 TABLET ORAL at 06:19

## 2023-04-09 VITALS — DIASTOLIC BLOOD PRESSURE: 77 MMHG | SYSTOLIC BLOOD PRESSURE: 153 MMHG

## 2023-04-09 VITALS — SYSTOLIC BLOOD PRESSURE: 167 MMHG | DIASTOLIC BLOOD PRESSURE: 81 MMHG

## 2023-04-09 VITALS — DIASTOLIC BLOOD PRESSURE: 84 MMHG | SYSTOLIC BLOOD PRESSURE: 169 MMHG

## 2023-04-09 VITALS — DIASTOLIC BLOOD PRESSURE: 83 MMHG | SYSTOLIC BLOOD PRESSURE: 150 MMHG

## 2023-04-09 VITALS — SYSTOLIC BLOOD PRESSURE: 151 MMHG | DIASTOLIC BLOOD PRESSURE: 84 MMHG

## 2023-04-09 RX ADMIN — Medication SCH EACH: at 06:26

## 2023-04-09 RX ADMIN — INSULIN GLARGINE SCH UNITS: 100 INJECTION, SOLUTION SUBCUTANEOUS at 20:26

## 2023-04-09 RX ADMIN — Medication SCH EACH: at 11:10

## 2023-04-09 RX ADMIN — DEXTROSE SCH MLS/HR: 50 INJECTION, SOLUTION INTRAVENOUS at 14:19

## 2023-04-09 RX ADMIN — CIPROFLOXACIN HYDROCHLORIDE SCH DROP: 3 SOLUTION/ DROPS OPHTHALMIC at 20:29

## 2023-04-09 RX ADMIN — HYDROCODONE BITARTRATE AND ACETAMINOPHEN PRN TAB: 5; 325 TABLET ORAL at 02:12

## 2023-04-09 RX ADMIN — CIPROFLOXACIN HYDROCHLORIDE SCH DROP: 3 SOLUTION/ DROPS OPHTHALMIC at 17:05

## 2023-04-09 RX ADMIN — Medication SCH EACH: at 20:29

## 2023-04-09 RX ADMIN — SODIUM CHLORIDE PRN UNIT: 9 INJECTION, SOLUTION INTRAVENOUS at 20:27

## 2023-04-09 RX ADMIN — CIPROFLOXACIN HYDROCHLORIDE SCH DROP: 3 SOLUTION/ DROPS OPHTHALMIC at 09:29

## 2023-04-09 RX ADMIN — CIPROFLOXACIN HYDROCHLORIDE SCH DROP: 3 SOLUTION/ DROPS OPHTHALMIC at 14:19

## 2023-04-09 RX ADMIN — PANTOPRAZOLE SODIUM SCH MG: 40 TABLET, DELAYED RELEASE ORAL at 06:26

## 2023-04-09 RX ADMIN — DEXTROSE SCH MLS/HR: 50 INJECTION, SOLUTION INTRAVENOUS at 06:25

## 2023-04-09 RX ADMIN — LORAZEPAM PRN MG: 2 INJECTION INTRAMUSCULAR; INTRAVENOUS at 08:04

## 2023-04-09 RX ADMIN — LORAZEPAM PRN MG: 2 INJECTION INTRAMUSCULAR; INTRAVENOUS at 20:28

## 2023-04-09 RX ADMIN — DEXTROSE SCH MLS/HR: 50 INJECTION, SOLUTION INTRAVENOUS at 09:05

## 2023-04-09 RX ADMIN — DEXTROSE AND SODIUM CHLORIDE PRN MLS/HR: 5; .9 INJECTION, SOLUTION INTRAVENOUS at 17:01

## 2023-04-09 RX ADMIN — LORAZEPAM PRN MG: 2 INJECTION INTRAMUSCULAR; INTRAVENOUS at 14:55

## 2023-04-09 RX ADMIN — Medication SCH EACH: at 17:05

## 2023-04-09 RX ADMIN — QUETIAPINE PRN MG: 25 TABLET, FILM COATED ORAL at 11:11

## 2023-04-09 RX ADMIN — HYDROCODONE BITARTRATE AND ACETAMINOPHEN PRN TAB: 5; 325 TABLET ORAL at 22:50

## 2023-04-09 RX ADMIN — SODIUM CHLORIDE PRN UNIT: 9 INJECTION, SOLUTION INTRAVENOUS at 11:14

## 2023-04-10 VITALS — SYSTOLIC BLOOD PRESSURE: 144 MMHG | DIASTOLIC BLOOD PRESSURE: 70 MMHG

## 2023-04-10 VITALS — SYSTOLIC BLOOD PRESSURE: 157 MMHG | DIASTOLIC BLOOD PRESSURE: 77 MMHG

## 2023-04-10 VITALS — DIASTOLIC BLOOD PRESSURE: 80 MMHG | SYSTOLIC BLOOD PRESSURE: 160 MMHG

## 2023-04-10 VITALS — SYSTOLIC BLOOD PRESSURE: 156 MMHG | DIASTOLIC BLOOD PRESSURE: 56 MMHG

## 2023-04-10 VITALS — SYSTOLIC BLOOD PRESSURE: 161 MMHG | DIASTOLIC BLOOD PRESSURE: 73 MMHG

## 2023-04-10 LAB
BUN SERPL-MCNC: 8 MG/DL (ref 7–18)
CHLORIDE SERPL-SCNC: 106 MMOL/L (ref 98–107)
CO2 SERPL-SCNC: 25 MMOL/L (ref 21–32)
CREAT SERPL-MCNC: 0.8 MG/DL (ref 0.6–1.3)
EOSINOPHIL NFR BLD MANUAL: 1 % (ref 0–8)
GLUCOSE SERPL-MCNC: 216 MG/DL (ref 74–106)
HCT VFR BLD AUTO: 33.5 % (ref 36.7–47.1)
LYMPHOCYTES NFR BLD MANUAL: 14 % (ref 20–40)
MAGNESIUM SERPL-MCNC: 1.6 MG/DL (ref 1.8–2.4)
MCH RBC QN AUTO: 31 UUG (ref 23.8–33.4)
MCV RBC AUTO: 91.2 FL (ref 73–96.2)
MONOCYTES NFR BLD MANUAL: 14 % (ref 2–10)
NEUTS SEG NFR BLD MANUAL: 71 % (ref 42–75)
PHOSPHATE SERPL-MCNC: 3 MG/DL (ref 2.5–4.9)
PLATELET # BLD AUTO: 337 K/UL (ref 152–348)
POTASSIUM SERPL-SCNC: 3.4 MMOL/L (ref 3.5–5.1)

## 2023-04-10 PROCEDURE — 05H933Z INSERTION OF INFUSION DEVICE INTO RIGHT BRACHIAL VEIN, PERCUTANEOUS APPROACH: ICD-10-PCS

## 2023-04-10 RX ADMIN — CIPROFLOXACIN HYDROCHLORIDE SCH DROP: 3 SOLUTION/ DROPS OPHTHALMIC at 09:39

## 2023-04-10 RX ADMIN — CIPROFLOXACIN HYDROCHLORIDE SCH DROP: 3 SOLUTION/ DROPS OPHTHALMIC at 15:00

## 2023-04-10 RX ADMIN — CIPROFLOXACIN HYDROCHLORIDE SCH DROP: 3 SOLUTION/ DROPS OPHTHALMIC at 17:31

## 2023-04-10 RX ADMIN — LORAZEPAM PRN MG: 2 INJECTION INTRAMUSCULAR; INTRAVENOUS at 00:22

## 2023-04-10 RX ADMIN — INSULIN HUMAN PRN UNITS: 100 INJECTION, SOLUTION PARENTERAL at 17:25

## 2023-04-10 RX ADMIN — PANTOPRAZOLE SODIUM SCH MG: 40 TABLET, DELAYED RELEASE ORAL at 06:01

## 2023-04-10 RX ADMIN — DEXTROSE SCH MLS/HR: 50 INJECTION, SOLUTION INTRAVENOUS at 14:57

## 2023-04-10 RX ADMIN — INSULIN GLARGINE SCH UNITS: 100 INJECTION, SOLUTION SUBCUTANEOUS at 21:06

## 2023-04-10 RX ADMIN — DEXTROSE SCH MLS/HR: 50 INJECTION, SOLUTION INTRAVENOUS at 00:23

## 2023-04-10 RX ADMIN — DEXTROSE AND SODIUM CHLORIDE PRN MLS/HR: 5; .9 INJECTION, SOLUTION INTRAVENOUS at 01:45

## 2023-04-10 RX ADMIN — CIPROFLOXACIN HYDROCHLORIDE SCH DROP: 3 SOLUTION/ DROPS OPHTHALMIC at 21:06

## 2023-04-10 RX ADMIN — HYDROCODONE BITARTRATE AND ACETAMINOPHEN PRN TAB: 5; 325 TABLET ORAL at 20:57

## 2023-04-10 RX ADMIN — SODIUM CHLORIDE PRN UNIT: 9 INJECTION, SOLUTION INTRAVENOUS at 09:59

## 2023-04-10 RX ADMIN — DEXTROSE SCH MLS/HR: 50 INJECTION, SOLUTION INTRAVENOUS at 01:29

## 2023-04-10 RX ADMIN — LORAZEPAM PRN MG: 2 INJECTION INTRAMUSCULAR; INTRAVENOUS at 17:30

## 2023-04-10 RX ADMIN — Medication SCH EACH: at 21:05

## 2023-04-10 RX ADMIN — LORAZEPAM PRN MG: 2 INJECTION INTRAMUSCULAR; INTRAVENOUS at 12:14

## 2023-04-10 RX ADMIN — AMLODIPINE BESYLATE SCH MG: 10 TABLET ORAL at 14:59

## 2023-04-10 RX ADMIN — Medication SCH EACH: at 06:01

## 2023-04-10 RX ADMIN — HYDROCODONE BITARTRATE AND ACETAMINOPHEN PRN TAB: 5; 325 TABLET ORAL at 06:06

## 2023-04-10 RX ADMIN — Medication SCH EACH: at 11:59

## 2023-04-10 RX ADMIN — QUETIAPINE PRN MG: 25 TABLET, FILM COATED ORAL at 20:56

## 2023-04-10 RX ADMIN — Medication SCH EACH: at 16:30

## 2023-04-10 RX ADMIN — DEXTROSE AND SODIUM CHLORIDE PRN MLS/HR: 5; .9 INJECTION, SOLUTION INTRAVENOUS at 12:47

## 2023-04-10 RX ADMIN — QUETIAPINE PRN MG: 25 TABLET, FILM COATED ORAL at 06:02

## 2023-04-11 VITALS — SYSTOLIC BLOOD PRESSURE: 178 MMHG | DIASTOLIC BLOOD PRESSURE: 86 MMHG

## 2023-04-11 VITALS — DIASTOLIC BLOOD PRESSURE: 76 MMHG | SYSTOLIC BLOOD PRESSURE: 137 MMHG

## 2023-04-11 VITALS — DIASTOLIC BLOOD PRESSURE: 75 MMHG | SYSTOLIC BLOOD PRESSURE: 165 MMHG

## 2023-04-11 VITALS — SYSTOLIC BLOOD PRESSURE: 173 MMHG | DIASTOLIC BLOOD PRESSURE: 67 MMHG

## 2023-04-11 VITALS — SYSTOLIC BLOOD PRESSURE: 114 MMHG | DIASTOLIC BLOOD PRESSURE: 70 MMHG

## 2023-04-11 LAB
BUN SERPL-MCNC: 10 MG/DL (ref 7–18)
CHLORIDE SERPL-SCNC: 105 MMOL/L (ref 98–107)
CO2 SERPL-SCNC: 27 MMOL/L (ref 21–32)
CREAT SERPL-MCNC: 0.9 MG/DL (ref 0.6–1.3)
GLUCOSE SERPL-MCNC: 223 MG/DL (ref 74–106)
MAGNESIUM SERPL-MCNC: 1.6 MG/DL (ref 1.8–2.4)
POTASSIUM SERPL-SCNC: 4.4 MMOL/L (ref 3.5–5.1)

## 2023-04-11 RX ADMIN — Medication SCH EACH: at 20:37

## 2023-04-11 RX ADMIN — CIPROFLOXACIN HYDROCHLORIDE SCH DROP: 3 SOLUTION/ DROPS OPHTHALMIC at 13:45

## 2023-04-11 RX ADMIN — SODIUM CHLORIDE PRN UNIT: 9 INJECTION, SOLUTION INTRAVENOUS at 17:09

## 2023-04-11 RX ADMIN — SODIUM CHLORIDE PRN UNIT: 9 INJECTION, SOLUTION INTRAVENOUS at 07:39

## 2023-04-11 RX ADMIN — QUETIAPINE PRN MG: 25 TABLET, FILM COATED ORAL at 14:07

## 2023-04-11 RX ADMIN — DEXTROSE SCH MLS/HR: 50 INJECTION, SOLUTION INTRAVENOUS at 01:18

## 2023-04-11 RX ADMIN — PANTOPRAZOLE SODIUM SCH MG: 40 TABLET, DELAYED RELEASE ORAL at 06:28

## 2023-04-11 RX ADMIN — LORAZEPAM PRN MG: 2 INJECTION INTRAMUSCULAR; INTRAVENOUS at 20:23

## 2023-04-11 RX ADMIN — QUETIAPINE PRN MG: 25 TABLET, FILM COATED ORAL at 09:04

## 2023-04-11 RX ADMIN — AMLODIPINE BESYLATE SCH MG: 10 TABLET ORAL at 09:04

## 2023-04-11 RX ADMIN — QUETIAPINE PRN MG: 25 TABLET, FILM COATED ORAL at 02:47

## 2023-04-11 RX ADMIN — DEXTROSE SCH MLS/HR: 50 INJECTION, SOLUTION INTRAVENOUS at 13:45

## 2023-04-11 RX ADMIN — LORAZEPAM PRN MG: 2 INJECTION INTRAMUSCULAR; INTRAVENOUS at 16:33

## 2023-04-11 RX ADMIN — LORAZEPAM PRN MG: 2 INJECTION INTRAMUSCULAR; INTRAVENOUS at 00:13

## 2023-04-11 RX ADMIN — SODIUM CHLORIDE PRN UNIT: 9 INJECTION, SOLUTION INTRAVENOUS at 11:38

## 2023-04-11 RX ADMIN — ACETAMINOPHEN PRN MG: 325 TABLET ORAL at 02:48

## 2023-04-11 RX ADMIN — CIPROFLOXACIN HYDROCHLORIDE SCH DROP: 3 SOLUTION/ DROPS OPHTHALMIC at 09:04

## 2023-04-11 RX ADMIN — Medication SCH EACH: at 11:37

## 2023-04-11 RX ADMIN — Medication SCH EACH: at 06:28

## 2023-04-11 RX ADMIN — LORAZEPAM PRN MG: 2 INJECTION INTRAMUSCULAR; INTRAVENOUS at 11:33

## 2023-04-11 RX ADMIN — DEXTROSE SCH MLS/HR: 50 INJECTION, SOLUTION INTRAVENOUS at 06:27

## 2023-04-11 RX ADMIN — LORAZEPAM PRN MG: 2 INJECTION INTRAMUSCULAR; INTRAVENOUS at 07:37

## 2023-04-11 RX ADMIN — Medication SCH EACH: at 17:08

## 2023-04-11 RX ADMIN — DEXTROSE AND SODIUM CHLORIDE PRN MLS/HR: 5; .9 INJECTION, SOLUTION INTRAVENOUS at 13:46

## 2023-04-11 RX ADMIN — DEXTROSE SCH MLS/HR: 50 INJECTION, SOLUTION INTRAVENOUS at 16:59

## 2023-04-11 RX ADMIN — DEXTROSE AND SODIUM CHLORIDE PRN MLS/HR: 5; .9 INJECTION, SOLUTION INTRAVENOUS at 00:12

## 2023-04-11 RX ADMIN — INSULIN HUMAN PRN UNITS: 100 INJECTION, SOLUTION PARENTERAL at 20:35

## 2023-04-11 RX ADMIN — INSULIN GLARGINE SCH UNITS: 100 INJECTION, SOLUTION SUBCUTANEOUS at 20:35

## 2023-04-11 RX ADMIN — CIPROFLOXACIN HYDROCHLORIDE SCH DROP: 3 SOLUTION/ DROPS OPHTHALMIC at 16:59

## 2023-04-11 RX ADMIN — CIPROFLOXACIN HYDROCHLORIDE SCH DROP: 3 SOLUTION/ DROPS OPHTHALMIC at 20:36

## 2023-04-12 VITALS — SYSTOLIC BLOOD PRESSURE: 138 MMHG | DIASTOLIC BLOOD PRESSURE: 74 MMHG

## 2023-04-12 VITALS — DIASTOLIC BLOOD PRESSURE: 75 MMHG | SYSTOLIC BLOOD PRESSURE: 165 MMHG

## 2023-04-12 VITALS — SYSTOLIC BLOOD PRESSURE: 147 MMHG | DIASTOLIC BLOOD PRESSURE: 76 MMHG

## 2023-04-12 VITALS — SYSTOLIC BLOOD PRESSURE: 126 MMHG | DIASTOLIC BLOOD PRESSURE: 64 MMHG

## 2023-04-12 VITALS — DIASTOLIC BLOOD PRESSURE: 84 MMHG | SYSTOLIC BLOOD PRESSURE: 139 MMHG

## 2023-04-12 LAB
BUN SERPL-MCNC: 13 MG/DL (ref 7–18)
CHLORIDE SERPL-SCNC: 103 MMOL/L (ref 98–107)
CO2 SERPL-SCNC: 28 MMOL/L (ref 21–32)
CREAT SERPL-MCNC: 0.9 MG/DL (ref 0.6–1.3)
GLUCOSE SERPL-MCNC: 92 MG/DL (ref 74–106)
HCT VFR BLD AUTO: 36.3 % (ref 36.7–47.1)
MAGNESIUM SERPL-MCNC: 2 MG/DL (ref 1.8–2.4)
MCH RBC QN AUTO: 30.9 UUG (ref 23.8–33.4)
MCV RBC AUTO: 91.2 FL (ref 73–96.2)
PHOSPHATE SERPL-MCNC: 3 MG/DL (ref 2.5–4.9)
PLATELET # BLD AUTO: 477 K/UL (ref 152–348)
POTASSIUM SERPL-SCNC: 4.6 MMOL/L (ref 3.5–5.1)

## 2023-04-12 RX ADMIN — DEXTROSE SCH MLS/HR: 50 INJECTION, SOLUTION INTRAVENOUS at 12:24

## 2023-04-12 RX ADMIN — HYDROCODONE BITARTRATE AND ACETAMINOPHEN PRN TAB: 5; 325 TABLET ORAL at 20:03

## 2023-04-12 RX ADMIN — LORAZEPAM PRN MG: 2 INJECTION INTRAMUSCULAR; INTRAVENOUS at 08:44

## 2023-04-12 RX ADMIN — PANTOPRAZOLE SODIUM SCH MG: 40 TABLET, DELAYED RELEASE ORAL at 07:21

## 2023-04-12 RX ADMIN — INSULIN GLARGINE SCH UNITS: 100 INJECTION, SOLUTION SUBCUTANEOUS at 20:04

## 2023-04-12 RX ADMIN — LORAZEPAM PRN MG: 2 INJECTION INTRAMUSCULAR; INTRAVENOUS at 17:07

## 2023-04-12 RX ADMIN — AMLODIPINE BESYLATE SCH MG: 10 TABLET ORAL at 08:44

## 2023-04-12 RX ADMIN — LORAZEPAM PRN MG: 2 INJECTION INTRAMUSCULAR; INTRAVENOUS at 01:06

## 2023-04-12 RX ADMIN — SODIUM CHLORIDE PRN UNIT: 9 INJECTION, SOLUTION INTRAVENOUS at 09:20

## 2023-04-12 RX ADMIN — QUETIAPINE PRN MG: 25 TABLET, FILM COATED ORAL at 16:03

## 2023-04-12 RX ADMIN — Medication SCH EACH: at 20:05

## 2023-04-12 RX ADMIN — DEXTROSE SCH MLS/HR: 5 SOLUTION INTRAVENOUS at 17:33

## 2023-04-12 RX ADMIN — DEXTROSE AND SODIUM CHLORIDE PRN MLS/HR: 5; .9 INJECTION, SOLUTION INTRAVENOUS at 00:00

## 2023-04-12 RX ADMIN — Medication SCH EACH: at 07:22

## 2023-04-12 RX ADMIN — DEXTROSE SCH MLS/HR: 50 INJECTION, SOLUTION INTRAVENOUS at 20:02

## 2023-04-12 RX ADMIN — DEXTROSE SCH MLS/HR: 50 INJECTION, SOLUTION INTRAVENOUS at 02:17

## 2023-04-12 RX ADMIN — DEXTROSE SCH MLS/HR: 50 INJECTION, SOLUTION INTRAVENOUS at 04:09

## 2023-04-12 RX ADMIN — QUETIAPINE PRN MG: 25 TABLET, FILM COATED ORAL at 10:32

## 2023-04-12 RX ADMIN — CIPROFLOXACIN HYDROCHLORIDE SCH DROP: 3 SOLUTION/ DROPS OPHTHALMIC at 08:44

## 2023-04-12 RX ADMIN — Medication SCH EACH: at 16:30

## 2023-04-12 RX ADMIN — Medication SCH EACH: at 12:20

## 2023-04-13 VITALS — DIASTOLIC BLOOD PRESSURE: 71 MMHG | SYSTOLIC BLOOD PRESSURE: 180 MMHG

## 2023-04-13 VITALS — DIASTOLIC BLOOD PRESSURE: 72 MMHG | SYSTOLIC BLOOD PRESSURE: 109 MMHG

## 2023-04-13 VITALS — SYSTOLIC BLOOD PRESSURE: 125 MMHG | DIASTOLIC BLOOD PRESSURE: 54 MMHG

## 2023-04-13 VITALS — DIASTOLIC BLOOD PRESSURE: 78 MMHG | SYSTOLIC BLOOD PRESSURE: 110 MMHG

## 2023-04-13 RX ADMIN — Medication SCH EACH: at 11:33

## 2023-04-13 RX ADMIN — DEXTROSE SCH MLS/HR: 5 SOLUTION INTRAVENOUS at 12:10

## 2023-04-13 RX ADMIN — DEXTROSE SCH MLS/HR: 50 INJECTION, SOLUTION INTRAVENOUS at 16:44

## 2023-04-13 RX ADMIN — Medication SCH EACH: at 17:24

## 2023-04-13 RX ADMIN — BACITRACIN, NEOMYCIN, POLYMYXIN B SCH APPLIC: 400; 3.5; 5 OINTMENT TOPICAL at 09:30

## 2023-04-13 RX ADMIN — AMLODIPINE BESYLATE SCH MG: 10 TABLET ORAL at 09:30

## 2023-04-13 RX ADMIN — LORAZEPAM PRN MG: 2 INJECTION INTRAMUSCULAR; INTRAVENOUS at 20:55

## 2023-04-13 RX ADMIN — Medication SCH EACH: at 21:26

## 2023-04-13 RX ADMIN — DEXTROSE SCH MLS/HR: 50 INJECTION, SOLUTION INTRAVENOUS at 20:55

## 2023-04-13 RX ADMIN — DEXTROSE SCH MLS/HR: 50 INJECTION, SOLUTION INTRAVENOUS at 04:25

## 2023-04-13 RX ADMIN — SODIUM CHLORIDE PRN UNIT: 9 INJECTION, SOLUTION INTRAVENOUS at 09:32

## 2023-04-13 RX ADMIN — INSULIN GLARGINE SCH UNITS: 100 INJECTION, SOLUTION SUBCUTANEOUS at 21:26

## 2023-04-13 RX ADMIN — INSULIN HUMAN PRN UNITS: 100 INJECTION, SOLUTION PARENTERAL at 21:33

## 2023-04-13 RX ADMIN — DEXTROSE SCH MLS/HR: 5 SOLUTION INTRAVENOUS at 02:07

## 2023-04-13 RX ADMIN — Medication SCH EACH: at 05:59

## 2023-04-13 RX ADMIN — LORAZEPAM PRN MG: 2 INJECTION INTRAMUSCULAR; INTRAVENOUS at 05:59

## 2023-04-13 RX ADMIN — QUETIAPINE PRN MG: 25 TABLET, FILM COATED ORAL at 09:30

## 2023-04-13 RX ADMIN — LORAZEPAM PRN MG: 2 INJECTION INTRAMUSCULAR; INTRAVENOUS at 12:49

## 2023-04-13 RX ADMIN — SODIUM CHLORIDE PRN UNIT: 9 INJECTION, SOLUTION INTRAVENOUS at 11:35

## 2023-04-13 RX ADMIN — ACETAMINOPHEN PRN MG: 325 TABLET ORAL at 10:49

## 2023-04-13 RX ADMIN — PANTOPRAZOLE SODIUM SCH MG: 40 TABLET, DELAYED RELEASE ORAL at 05:59

## 2023-04-14 VITALS — SYSTOLIC BLOOD PRESSURE: 153 MMHG | DIASTOLIC BLOOD PRESSURE: 76 MMHG

## 2023-04-14 VITALS — SYSTOLIC BLOOD PRESSURE: 155 MMHG | DIASTOLIC BLOOD PRESSURE: 83 MMHG

## 2023-04-14 VITALS — DIASTOLIC BLOOD PRESSURE: 70 MMHG | SYSTOLIC BLOOD PRESSURE: 133 MMHG

## 2023-04-14 VITALS — SYSTOLIC BLOOD PRESSURE: 177 MMHG | DIASTOLIC BLOOD PRESSURE: 81 MMHG

## 2023-04-14 LAB
BUN SERPL-MCNC: 14 MG/DL (ref 7–18)
CHLORIDE SERPL-SCNC: 102 MMOL/L (ref 98–107)
CO2 SERPL-SCNC: 23 MMOL/L (ref 21–32)
CREAT SERPL-MCNC: 0.9 MG/DL (ref 0.6–1.3)
GLUCOSE SERPL-MCNC: 174 MG/DL (ref 74–106)
HCT VFR BLD AUTO: 37 % (ref 36.7–47.1)
MAGNESIUM SERPL-MCNC: 2 MG/DL (ref 1.8–2.4)
MCH RBC QN AUTO: 30.7 UUG (ref 23.8–33.4)
MCV RBC AUTO: 91.4 FL (ref 73–96.2)
PHOSPHATE SERPL-MCNC: 3.5 MG/DL (ref 2.5–4.9)
PLATELET # BLD AUTO: 598 K/UL (ref 152–348)
POTASSIUM SERPL-SCNC: 3.9 MMOL/L (ref 3.5–5.1)

## 2023-04-14 RX ADMIN — HYDROCODONE BITARTRATE AND ACETAMINOPHEN PRN TAB: 5; 325 TABLET ORAL at 08:44

## 2023-04-14 RX ADMIN — LORAZEPAM PRN MG: 2 INJECTION INTRAMUSCULAR; INTRAVENOUS at 20:32

## 2023-04-14 RX ADMIN — Medication SCH EACH: at 11:38

## 2023-04-14 RX ADMIN — QUETIAPINE PRN MG: 25 TABLET, FILM COATED ORAL at 09:16

## 2023-04-14 RX ADMIN — Medication SCH EACH: at 20:40

## 2023-04-14 RX ADMIN — LORAZEPAM PRN MG: 2 INJECTION INTRAMUSCULAR; INTRAVENOUS at 02:18

## 2023-04-14 RX ADMIN — BACITRACIN, NEOMYCIN, POLYMYXIN B SCH APPLIC: 400; 3.5; 5 OINTMENT TOPICAL at 08:56

## 2023-04-14 RX ADMIN — Medication SCH EACH: at 07:03

## 2023-04-14 RX ADMIN — DEXTROSE SCH MLS/HR: 50 INJECTION, SOLUTION INTRAVENOUS at 12:47

## 2023-04-14 RX ADMIN — LORAZEPAM PRN MG: 2 INJECTION INTRAMUSCULAR; INTRAVENOUS at 07:02

## 2023-04-14 RX ADMIN — LORAZEPAM PRN MG: 2 INJECTION INTRAMUSCULAR; INTRAVENOUS at 13:04

## 2023-04-14 RX ADMIN — Medication SCH EACH: at 16:37

## 2023-04-14 RX ADMIN — SODIUM CHLORIDE PRN UNIT: 9 INJECTION, SOLUTION INTRAVENOUS at 10:00

## 2023-04-14 RX ADMIN — INSULIN GLARGINE SCH UNITS: 100 INJECTION, SOLUTION SUBCUTANEOUS at 20:41

## 2023-04-14 RX ADMIN — AMLODIPINE BESYLATE SCH MG: 10 TABLET ORAL at 08:32

## 2023-04-14 RX ADMIN — SODIUM CHLORIDE PRN UNIT: 9 INJECTION, SOLUTION INTRAVENOUS at 11:40

## 2023-04-14 RX ADMIN — SODIUM CHLORIDE PRN UNIT: 9 INJECTION, SOLUTION INTRAVENOUS at 20:42

## 2023-04-14 RX ADMIN — DEXTROSE SCH MLS/HR: 50 INJECTION, SOLUTION INTRAVENOUS at 04:43

## 2023-04-14 RX ADMIN — PANTOPRAZOLE SODIUM SCH MG: 40 TABLET, DELAYED RELEASE ORAL at 06:17

## 2023-04-14 RX ADMIN — HYDROCODONE BITARTRATE AND ACETAMINOPHEN PRN TAB: 5; 325 TABLET ORAL at 16:46

## 2023-04-15 VITALS — DIASTOLIC BLOOD PRESSURE: 49 MMHG | SYSTOLIC BLOOD PRESSURE: 106 MMHG

## 2023-04-15 VITALS — DIASTOLIC BLOOD PRESSURE: 73 MMHG | SYSTOLIC BLOOD PRESSURE: 138 MMHG

## 2023-04-15 VITALS — DIASTOLIC BLOOD PRESSURE: 82 MMHG | SYSTOLIC BLOOD PRESSURE: 138 MMHG

## 2023-04-15 VITALS — DIASTOLIC BLOOD PRESSURE: 67 MMHG | SYSTOLIC BLOOD PRESSURE: 133 MMHG

## 2023-04-15 RX ADMIN — SODIUM CHLORIDE PRN UNIT: 9 INJECTION, SOLUTION INTRAVENOUS at 16:41

## 2023-04-15 RX ADMIN — QUETIAPINE PRN MG: 25 TABLET, FILM COATED ORAL at 01:40

## 2023-04-15 RX ADMIN — QUETIAPINE PRN MG: 25 TABLET, FILM COATED ORAL at 09:32

## 2023-04-15 RX ADMIN — HYDROCODONE BITARTRATE AND ACETAMINOPHEN PRN TAB: 5; 325 TABLET ORAL at 22:50

## 2023-04-15 RX ADMIN — AMLODIPINE BESYLATE SCH MG: 10 TABLET ORAL at 08:11

## 2023-04-15 RX ADMIN — PANTOPRAZOLE SODIUM SCH MG: 40 TABLET, DELAYED RELEASE ORAL at 06:13

## 2023-04-15 RX ADMIN — HYDROCODONE BITARTRATE AND ACETAMINOPHEN PRN TAB: 5; 325 TABLET ORAL at 11:05

## 2023-04-15 RX ADMIN — LORAZEPAM PRN MG: 2 INJECTION INTRAMUSCULAR; INTRAVENOUS at 07:54

## 2023-04-15 RX ADMIN — QUETIAPINE PRN MG: 25 TABLET, FILM COATED ORAL at 15:38

## 2023-04-15 RX ADMIN — Medication SCH EACH: at 21:08

## 2023-04-15 RX ADMIN — LORAZEPAM PRN MG: 2 INJECTION INTRAMUSCULAR; INTRAVENOUS at 12:01

## 2023-04-15 RX ADMIN — Medication SCH EACH: at 16:37

## 2023-04-15 RX ADMIN — Medication SCH EACH: at 11:19

## 2023-04-15 RX ADMIN — BACITRACIN, NEOMYCIN, POLYMYXIN B SCH APPLIC: 400; 3.5; 5 OINTMENT TOPICAL at 08:12

## 2023-04-15 RX ADMIN — LORAZEPAM PRN MG: 2 INJECTION INTRAMUSCULAR; INTRAVENOUS at 22:11

## 2023-04-15 RX ADMIN — SODIUM CHLORIDE PRN UNIT: 9 INJECTION, SOLUTION INTRAVENOUS at 11:20

## 2023-04-15 RX ADMIN — LORAZEPAM PRN MG: 2 INJECTION INTRAMUSCULAR; INTRAVENOUS at 16:37

## 2023-04-15 RX ADMIN — Medication SCH EACH: at 06:35

## 2023-04-15 RX ADMIN — INSULIN GLARGINE SCH UNITS: 100 INJECTION, SOLUTION SUBCUTANEOUS at 21:00

## 2023-04-16 VITALS — DIASTOLIC BLOOD PRESSURE: 83 MMHG | SYSTOLIC BLOOD PRESSURE: 143 MMHG

## 2023-04-16 VITALS — SYSTOLIC BLOOD PRESSURE: 160 MMHG | DIASTOLIC BLOOD PRESSURE: 82 MMHG

## 2023-04-16 VITALS — SYSTOLIC BLOOD PRESSURE: 164 MMHG | DIASTOLIC BLOOD PRESSURE: 79 MMHG

## 2023-04-16 VITALS — DIASTOLIC BLOOD PRESSURE: 78 MMHG | SYSTOLIC BLOOD PRESSURE: 174 MMHG

## 2023-04-16 LAB
BUN SERPL-MCNC: 20 MG/DL (ref 7–18)
CHLORIDE SERPL-SCNC: 99 MMOL/L (ref 98–107)
CO2 SERPL-SCNC: 29 MMOL/L (ref 21–32)
CREAT SERPL-MCNC: 1 MG/DL (ref 0.6–1.3)
GLUCOSE SERPL-MCNC: 152 MG/DL (ref 74–106)
HCT VFR BLD AUTO: 39.1 % (ref 36.7–47.1)
MAGNESIUM SERPL-MCNC: 1.9 MG/DL (ref 1.8–2.4)
MCH RBC QN AUTO: 30.7 UUG (ref 23.8–33.4)
MCV RBC AUTO: 92.1 FL (ref 73–96.2)
PHOSPHATE SERPL-MCNC: 3.8 MG/DL (ref 2.5–4.9)
PLATELET # BLD AUTO: 697 K/UL (ref 152–348)
POTASSIUM SERPL-SCNC: 4.9 MMOL/L (ref 3.5–5.1)

## 2023-04-16 RX ADMIN — Medication SCH MG: at 08:46

## 2023-04-16 RX ADMIN — LORAZEPAM PRN MG: 2 INJECTION INTRAMUSCULAR; INTRAVENOUS at 03:01

## 2023-04-16 RX ADMIN — LORAZEPAM PRN MG: 2 INJECTION INTRAMUSCULAR; INTRAVENOUS at 18:02

## 2023-04-16 RX ADMIN — BACITRACIN, NEOMYCIN, POLYMYXIN B SCH APPLIC: 400; 3.5; 5 OINTMENT TOPICAL at 08:46

## 2023-04-16 RX ADMIN — LORAZEPAM PRN MG: 2 INJECTION INTRAMUSCULAR; INTRAVENOUS at 13:23

## 2023-04-16 RX ADMIN — SODIUM CHLORIDE PRN UNIT: 9 INJECTION, SOLUTION INTRAVENOUS at 16:47

## 2023-04-16 RX ADMIN — LORAZEPAM PRN MG: 2 INJECTION INTRAMUSCULAR; INTRAVENOUS at 23:03

## 2023-04-16 RX ADMIN — Medication SCH EACH: at 06:49

## 2023-04-16 RX ADMIN — SODIUM CHLORIDE PRN UNIT: 9 INJECTION, SOLUTION INTRAVENOUS at 08:55

## 2023-04-16 RX ADMIN — QUETIAPINE PRN MG: 25 TABLET, FILM COATED ORAL at 12:08

## 2023-04-16 RX ADMIN — AMLODIPINE BESYLATE SCH MG: 10 TABLET ORAL at 08:58

## 2023-04-16 RX ADMIN — INSULIN GLARGINE SCH UNITS: 100 INJECTION, SOLUTION SUBCUTANEOUS at 21:00

## 2023-04-16 RX ADMIN — Medication SCH EACH: at 21:00

## 2023-04-16 RX ADMIN — QUETIAPINE PRN MG: 25 TABLET, FILM COATED ORAL at 00:25

## 2023-04-16 RX ADMIN — Medication SCH EACH: at 16:41

## 2023-04-16 RX ADMIN — Medication SCH EACH: at 11:50

## 2023-04-16 RX ADMIN — HYDROCODONE BITARTRATE AND ACETAMINOPHEN PRN TAB: 5; 325 TABLET ORAL at 12:10

## 2023-04-16 RX ADMIN — SODIUM CHLORIDE PRN UNIT: 9 INJECTION, SOLUTION INTRAVENOUS at 11:53

## 2023-04-16 RX ADMIN — PANTOPRAZOLE SODIUM SCH MG: 40 TABLET, DELAYED RELEASE ORAL at 06:32

## 2023-04-17 VITALS — DIASTOLIC BLOOD PRESSURE: 76 MMHG | SYSTOLIC BLOOD PRESSURE: 146 MMHG

## 2023-04-17 VITALS — SYSTOLIC BLOOD PRESSURE: 119 MMHG | DIASTOLIC BLOOD PRESSURE: 97 MMHG

## 2023-04-17 VITALS — DIASTOLIC BLOOD PRESSURE: 73 MMHG | SYSTOLIC BLOOD PRESSURE: 138 MMHG

## 2023-04-17 VITALS — DIASTOLIC BLOOD PRESSURE: 92 MMHG | SYSTOLIC BLOOD PRESSURE: 158 MMHG

## 2023-04-17 RX ADMIN — LORAZEPAM PRN MG: 2 INJECTION INTRAMUSCULAR; INTRAVENOUS at 04:45

## 2023-04-17 RX ADMIN — QUETIAPINE PRN MG: 25 TABLET, FILM COATED ORAL at 15:04

## 2023-04-17 RX ADMIN — AMLODIPINE BESYLATE SCH MG: 10 TABLET ORAL at 08:38

## 2023-04-17 RX ADMIN — INSULIN GLARGINE SCH UNITS: 100 INJECTION, SOLUTION SUBCUTANEOUS at 22:00

## 2023-04-17 RX ADMIN — BACITRACIN, NEOMYCIN, POLYMYXIN B SCH APPLIC: 400; 3.5; 5 OINTMENT TOPICAL at 08:35

## 2023-04-17 RX ADMIN — Medication SCH EACH: at 16:30

## 2023-04-17 RX ADMIN — PANTOPRAZOLE SODIUM SCH MG: 40 TABLET, DELAYED RELEASE ORAL at 07:55

## 2023-04-17 RX ADMIN — SODIUM CHLORIDE PRN UNIT: 9 INJECTION, SOLUTION INTRAVENOUS at 11:58

## 2023-04-17 RX ADMIN — Medication SCH EACH: at 07:56

## 2023-04-17 RX ADMIN — HYDROCODONE BITARTRATE AND ACETAMINOPHEN PRN TAB: 5; 325 TABLET ORAL at 15:04

## 2023-04-17 RX ADMIN — Medication SCH EACH: at 22:00

## 2023-04-17 RX ADMIN — Medication SCH MG: at 08:33

## 2023-04-17 RX ADMIN — Medication SCH EACH: at 11:56

## 2023-04-17 RX ADMIN — LORAZEPAM PRN MG: 2 INJECTION INTRAMUSCULAR; INTRAVENOUS at 22:20

## 2023-04-17 RX ADMIN — LORAZEPAM PRN MG: 2 INJECTION INTRAMUSCULAR; INTRAVENOUS at 13:49

## 2023-04-17 RX ADMIN — SODIUM CHLORIDE PRN UNIT: 9 INJECTION, SOLUTION INTRAVENOUS at 17:48

## 2023-04-17 RX ADMIN — INSULIN HUMAN PRN UNITS: 100 INJECTION, SOLUTION PARENTERAL at 23:05

## 2023-04-17 RX ADMIN — SODIUM CHLORIDE PRN UNIT: 9 INJECTION, SOLUTION INTRAVENOUS at 08:43

## 2023-04-18 VITALS — DIASTOLIC BLOOD PRESSURE: 75 MMHG | SYSTOLIC BLOOD PRESSURE: 122 MMHG

## 2023-04-18 VITALS — SYSTOLIC BLOOD PRESSURE: 143 MMHG | DIASTOLIC BLOOD PRESSURE: 63 MMHG

## 2023-04-18 VITALS — DIASTOLIC BLOOD PRESSURE: 89 MMHG | SYSTOLIC BLOOD PRESSURE: 132 MMHG

## 2023-04-18 VITALS — DIASTOLIC BLOOD PRESSURE: 64 MMHG | SYSTOLIC BLOOD PRESSURE: 148 MMHG

## 2023-04-18 RX ADMIN — Medication SCH EACH: at 21:09

## 2023-04-18 RX ADMIN — Medication SCH EACH: at 16:30

## 2023-04-18 RX ADMIN — HYDROCODONE BITARTRATE AND ACETAMINOPHEN PRN TAB: 5; 325 TABLET ORAL at 14:45

## 2023-04-18 RX ADMIN — Medication SCH MG: at 08:14

## 2023-04-18 RX ADMIN — ACETAMINOPHEN PRN MG: 325 TABLET ORAL at 21:07

## 2023-04-18 RX ADMIN — SODIUM CHLORIDE PRN UNIT: 9 INJECTION, SOLUTION INTRAVENOUS at 13:29

## 2023-04-18 RX ADMIN — QUETIAPINE PRN MG: 25 TABLET, FILM COATED ORAL at 14:45

## 2023-04-18 RX ADMIN — BACITRACIN, NEOMYCIN, POLYMYXIN B SCH APPLIC: 400; 3.5; 5 OINTMENT TOPICAL at 08:14

## 2023-04-18 RX ADMIN — Medication SCH EACH: at 06:54

## 2023-04-18 RX ADMIN — LORAZEPAM PRN MG: 2 INJECTION INTRAMUSCULAR; INTRAVENOUS at 08:15

## 2023-04-18 RX ADMIN — AMLODIPINE BESYLATE SCH MG: 10 TABLET ORAL at 08:13

## 2023-04-18 RX ADMIN — Medication SCH EACH: at 11:30

## 2023-04-18 RX ADMIN — QUETIAPINE PRN MG: 25 TABLET, FILM COATED ORAL at 00:33

## 2023-04-18 RX ADMIN — HYDROCODONE BITARTRATE AND ACETAMINOPHEN PRN TAB: 5; 325 TABLET ORAL at 21:08

## 2023-04-18 RX ADMIN — PANTOPRAZOLE SODIUM SCH MG: 40 TABLET, DELAYED RELEASE ORAL at 06:42

## 2023-04-18 RX ADMIN — QUETIAPINE PRN MG: 25 TABLET, FILM COATED ORAL at 08:15

## 2023-04-18 RX ADMIN — INSULIN HUMAN PRN UNITS: 100 INJECTION, SOLUTION PARENTERAL at 21:10

## 2023-04-18 RX ADMIN — QUETIAPINE PRN MG: 25 TABLET, FILM COATED ORAL at 21:08

## 2023-04-18 RX ADMIN — INSULIN GLARGINE SCH UNITS: 100 INJECTION, SOLUTION SUBCUTANEOUS at 21:09

## 2023-04-18 RX ADMIN — SODIUM CHLORIDE PRN UNIT: 9 INJECTION, SOLUTION INTRAVENOUS at 18:02

## 2023-04-19 VITALS — SYSTOLIC BLOOD PRESSURE: 130 MMHG | DIASTOLIC BLOOD PRESSURE: 70 MMHG

## 2023-04-19 VITALS — DIASTOLIC BLOOD PRESSURE: 78 MMHG | SYSTOLIC BLOOD PRESSURE: 161 MMHG

## 2023-04-19 VITALS — SYSTOLIC BLOOD PRESSURE: 134 MMHG | DIASTOLIC BLOOD PRESSURE: 69 MMHG

## 2023-04-19 RX ADMIN — AMLODIPINE BESYLATE SCH MG: 10 TABLET ORAL at 09:20

## 2023-04-19 RX ADMIN — ACETAMINOPHEN PRN MG: 325 TABLET ORAL at 03:29

## 2023-04-19 RX ADMIN — BACITRACIN, NEOMYCIN, POLYMYXIN B SCH APPLIC: 400; 3.5; 5 OINTMENT TOPICAL at 09:21

## 2023-04-19 RX ADMIN — SODIUM CHLORIDE PRN UNIT: 9 INJECTION, SOLUTION INTRAVENOUS at 12:32

## 2023-04-19 RX ADMIN — Medication SCH MG: at 09:20

## 2023-04-19 RX ADMIN — Medication SCH EACH: at 07:18

## 2023-04-19 RX ADMIN — QUETIAPINE PRN MG: 25 TABLET, FILM COATED ORAL at 03:29

## 2023-04-19 RX ADMIN — QUETIAPINE PRN MG: 25 TABLET, FILM COATED ORAL at 09:20

## 2023-04-19 RX ADMIN — PANTOPRAZOLE SODIUM SCH MG: 40 TABLET, DELAYED RELEASE ORAL at 07:18

## 2023-04-19 RX ADMIN — Medication SCH EACH: at 11:30

## 2023-04-19 RX ADMIN — HYDROCODONE BITARTRATE AND ACETAMINOPHEN PRN TAB: 5; 325 TABLET ORAL at 03:29
